# Patient Record
Sex: FEMALE | NOT HISPANIC OR LATINO | Employment: OTHER | URBAN - METROPOLITAN AREA
[De-identification: names, ages, dates, MRNs, and addresses within clinical notes are randomized per-mention and may not be internally consistent; named-entity substitution may affect disease eponyms.]

---

## 2019-10-14 ENCOUNTER — HOSPITAL ENCOUNTER (OUTPATIENT)
Dept: OTHER | Facility: HOSPITAL | Age: 54
Discharge: HOME | End: 2019-10-14
Attending: NURSE PRACTITIONER

## 2020-11-09 ENCOUNTER — HOSPITAL ENCOUNTER (OUTPATIENT)
Dept: OTHER | Facility: HOSPITAL | Age: 55
Discharge: HOME | End: 2020-11-09
Attending: FAMILY MEDICINE

## 2020-11-12 LAB — SARS-COV-2 RNA SPEC QL NAA+PROBE: DETECTED

## 2021-04-22 ENCOUNTER — HOSPITAL ENCOUNTER (OUTPATIENT)
Dept: OTHER | Facility: HOSPITAL | Age: 56
Discharge: HOME | End: 2021-04-22
Attending: NURSE PRACTITIONER

## 2021-09-17 ENCOUNTER — HOSPITAL ENCOUNTER (INPATIENT)
Facility: HOSPITAL | Age: 56
LOS: 4 days | Discharge: HOME HEALTH | End: 2021-09-21
Attending: HOSPITALIST | Admitting: INTERNAL MEDICINE

## 2021-09-17 ENCOUNTER — APPOINTMENT (OUTPATIENT)
Dept: NEUROLOGY | Facility: HOSPITAL | Age: 56
End: 2021-09-17

## 2021-09-17 DIAGNOSIS — R56.9 SEIZURE (HCC): Primary | ICD-10-CM

## 2021-09-17 DIAGNOSIS — G40.919 BREAKTHROUGH SEIZURE (HCC): ICD-10-CM

## 2021-09-17 PROBLEM — G40.909 EPILEPSY: Status: ACTIVE | Noted: 2021-09-17

## 2021-09-17 PROBLEM — Z86.73 HISTORY OF ISCHEMIC STROKE: Status: ACTIVE | Noted: 2021-09-17

## 2021-09-17 PROBLEM — E11.649 TYPE 2 DIABETES MELLITUS WITH HYPOGLYCEMIA WITHOUT COMA: Status: ACTIVE | Noted: 2021-09-17

## 2021-09-17 PROBLEM — I10 HTN (HYPERTENSION): Status: ACTIVE | Noted: 2021-09-17

## 2021-09-17 LAB
ANION GAP SERPL CALCULATED.3IONS-SCNC: 13.1 MMOL/L (ref 5–15)
BASOPHILS # BLD AUTO: 0.02 10*3/MM3 (ref 0–0.2)
BASOPHILS NFR BLD AUTO: 0.2 % (ref 0–1.5)
BUN SERPL-MCNC: 7 MG/DL (ref 6–20)
BUN/CREAT SERPL: 10 (ref 7–25)
CALCIUM SPEC-SCNC: 8.3 MG/DL (ref 8.6–10.5)
CHLORIDE SERPL-SCNC: 100 MMOL/L (ref 98–107)
CK SERPL-CCNC: 161 U/L (ref 20–180)
CO2 SERPL-SCNC: 24.9 MMOL/L (ref 22–29)
CREAT SERPL-MCNC: 0.7 MG/DL (ref 0.57–1)
D-LACTATE SERPL-SCNC: 1.9 MMOL/L (ref 0.5–2)
DEPRECATED RDW RBC AUTO: 44 FL (ref 37–54)
EOSINOPHIL # BLD AUTO: 0.01 10*3/MM3 (ref 0–0.4)
EOSINOPHIL NFR BLD AUTO: 0.1 % (ref 0.3–6.2)
ERYTHROCYTE [DISTWIDTH] IN BLOOD BY AUTOMATED COUNT: 13.3 % (ref 12.3–15.4)
GFR SERPL CREATININE-BSD FRML MDRD: 105 ML/MIN/1.73
GFR SERPL CREATININE-BSD FRML MDRD: 87 ML/MIN/1.73
GLUCOSE BLDC GLUCOMTR-MCNC: 105 MG/DL (ref 70–130)
GLUCOSE BLDC GLUCOMTR-MCNC: 112 MG/DL (ref 70–130)
GLUCOSE BLDC GLUCOMTR-MCNC: 124 MG/DL (ref 70–130)
GLUCOSE BLDC GLUCOMTR-MCNC: 124 MG/DL (ref 70–130)
GLUCOSE BLDC GLUCOMTR-MCNC: 132 MG/DL (ref 70–130)
GLUCOSE SERPL-MCNC: 120 MG/DL (ref 65–99)
HBA1C MFR BLD: 5.64 % (ref 4.8–5.6)
HCT VFR BLD AUTO: 37.2 % (ref 34–46.6)
HGB BLD-MCNC: 11.7 G/DL (ref 12–15.9)
IMM GRANULOCYTES # BLD AUTO: 0.08 10*3/MM3 (ref 0–0.05)
IMM GRANULOCYTES NFR BLD AUTO: 0.8 % (ref 0–0.5)
INR PPP: 0.97 (ref 0.9–1.1)
LYMPHOCYTES # BLD AUTO: 1.15 10*3/MM3 (ref 0.7–3.1)
LYMPHOCYTES NFR BLD AUTO: 12.1 % (ref 19.6–45.3)
MAGNESIUM SERPL-MCNC: 1.5 MG/DL (ref 1.6–2.6)
MCH RBC QN AUTO: 28 PG (ref 26.6–33)
MCHC RBC AUTO-ENTMCNC: 31.5 G/DL (ref 31.5–35.7)
MCV RBC AUTO: 89 FL (ref 79–97)
MONOCYTES # BLD AUTO: 0.51 10*3/MM3 (ref 0.1–0.9)
MONOCYTES NFR BLD AUTO: 5.4 % (ref 5–12)
NEUTROPHILS NFR BLD AUTO: 7.75 10*3/MM3 (ref 1.7–7)
NEUTROPHILS NFR BLD AUTO: 81.4 % (ref 42.7–76)
NRBC BLD AUTO-RTO: 0 /100 WBC (ref 0–0.2)
PHOSPHATE SERPL-MCNC: 3.2 MG/DL (ref 2.5–4.5)
PLATELET # BLD AUTO: 216 10*3/MM3 (ref 140–450)
PMV BLD AUTO: 10.2 FL (ref 6–12)
POTASSIUM SERPL-SCNC: 3.9 MMOL/L (ref 3.5–5.2)
PROTHROMBIN TIME: 12.6 SECONDS (ref 11.7–14.2)
RBC # BLD AUTO: 4.18 10*6/MM3 (ref 3.77–5.28)
SODIUM SERPL-SCNC: 138 MMOL/L (ref 136–145)
WBC # BLD AUTO: 9.52 10*3/MM3 (ref 3.4–10.8)

## 2021-09-17 PROCEDURE — 25010000003 LEVETIRACETAM IN NACL 0.54% 1500 MG/100ML SOLUTION: Performed by: PSYCHIATRY & NEUROLOGY

## 2021-09-17 PROCEDURE — 83605 ASSAY OF LACTIC ACID: CPT | Performed by: NURSE PRACTITIONER

## 2021-09-17 PROCEDURE — 85025 COMPLETE CBC W/AUTO DIFF WBC: CPT | Performed by: NURSE PRACTITIONER

## 2021-09-17 PROCEDURE — 82550 ASSAY OF CK (CPK): CPT | Performed by: NURSE PRACTITIONER

## 2021-09-17 PROCEDURE — 83036 HEMOGLOBIN GLYCOSYLATED A1C: CPT | Performed by: NURSE PRACTITIONER

## 2021-09-17 PROCEDURE — 25010000002 LORAZEPAM PER 2 MG: Performed by: NURSE PRACTITIONER

## 2021-09-17 PROCEDURE — 82962 GLUCOSE BLOOD TEST: CPT

## 2021-09-17 PROCEDURE — 95816 EEG AWAKE AND DROWSY: CPT

## 2021-09-17 PROCEDURE — 25010000003 LEVETIRACETAM IN NACL 0.75% 1000 MG/100ML SOLUTION: Performed by: NURSE PRACTITIONER

## 2021-09-17 PROCEDURE — 95816 EEG AWAKE AND DROWSY: CPT | Performed by: PSYCHIATRY & NEUROLOGY

## 2021-09-17 PROCEDURE — 84100 ASSAY OF PHOSPHORUS: CPT | Performed by: NURSE PRACTITIONER

## 2021-09-17 PROCEDURE — 85610 PROTHROMBIN TIME: CPT | Performed by: NURSE PRACTITIONER

## 2021-09-17 PROCEDURE — 80048 BASIC METABOLIC PNL TOTAL CA: CPT | Performed by: NURSE PRACTITIONER

## 2021-09-17 PROCEDURE — 25010000002 ONDANSETRON PER 1 MG: Performed by: NURSE PRACTITIONER

## 2021-09-17 PROCEDURE — 25010000002 LORAZEPAM PER 2 MG: Performed by: PSYCHIATRY & NEUROLOGY

## 2021-09-17 PROCEDURE — 99222 1ST HOSP IP/OBS MODERATE 55: CPT | Performed by: PSYCHIATRY & NEUROLOGY

## 2021-09-17 PROCEDURE — 83735 ASSAY OF MAGNESIUM: CPT | Performed by: NURSE PRACTITIONER

## 2021-09-17 RX ORDER — DEXTROSE MONOHYDRATE 25 G/50ML
25 INJECTION, SOLUTION INTRAVENOUS
Status: DISCONTINUED | OUTPATIENT
Start: 2021-09-17 | End: 2021-09-21 | Stop reason: HOSPADM

## 2021-09-17 RX ORDER — NITROGLYCERIN 0.4 MG/1
0.4 TABLET SUBLINGUAL
Status: DISCONTINUED | OUTPATIENT
Start: 2021-09-17 | End: 2021-09-21 | Stop reason: HOSPADM

## 2021-09-17 RX ORDER — ACETAMINOPHEN 650 MG/1
650 SUPPOSITORY RECTAL EVERY 4 HOURS PRN
Status: DISCONTINUED | OUTPATIENT
Start: 2021-09-17 | End: 2021-09-21 | Stop reason: HOSPADM

## 2021-09-17 RX ORDER — ONDANSETRON 2 MG/ML
4 INJECTION INTRAMUSCULAR; INTRAVENOUS EVERY 6 HOURS PRN
Status: DISCONTINUED | OUTPATIENT
Start: 2021-09-17 | End: 2021-09-21 | Stop reason: HOSPADM

## 2021-09-17 RX ORDER — NYSTATIN 100000 [USP'U]/G
POWDER TOPICAL EVERY 12 HOURS SCHEDULED
Status: DISCONTINUED | OUTPATIENT
Start: 2021-09-17 | End: 2021-09-21 | Stop reason: HOSPADM

## 2021-09-17 RX ORDER — SODIUM CHLORIDE 9 MG/ML
75 INJECTION, SOLUTION INTRAVENOUS CONTINUOUS
Status: DISCONTINUED | OUTPATIENT
Start: 2021-09-17 | End: 2021-09-21 | Stop reason: HOSPADM

## 2021-09-17 RX ORDER — AMLODIPINE BESYLATE 5 MG/1
5 TABLET ORAL DAILY
Status: DISCONTINUED | OUTPATIENT
Start: 2021-09-17 | End: 2021-09-19

## 2021-09-17 RX ORDER — LISINOPRIL 40 MG/1
40 TABLET ORAL DAILY
Status: DISCONTINUED | OUTPATIENT
Start: 2021-09-17 | End: 2021-09-19

## 2021-09-17 RX ORDER — ACETAMINOPHEN 160 MG/5ML
650 SOLUTION ORAL EVERY 4 HOURS PRN
Status: DISCONTINUED | OUTPATIENT
Start: 2021-09-17 | End: 2021-09-21 | Stop reason: HOSPADM

## 2021-09-17 RX ORDER — PIOGLITAZONEHYDROCHLORIDE 15 MG/1
15 TABLET ORAL DAILY
COMMUNITY

## 2021-09-17 RX ORDER — CHOLECALCIFEROL (VITAMIN D3) 125 MCG
10 CAPSULE ORAL NIGHTLY PRN
Status: DISCONTINUED | OUTPATIENT
Start: 2021-09-17 | End: 2021-09-21 | Stop reason: HOSPADM

## 2021-09-17 RX ORDER — ACETAMINOPHEN 325 MG/1
650 TABLET ORAL EVERY 4 HOURS PRN
Status: DISCONTINUED | OUTPATIENT
Start: 2021-09-17 | End: 2021-09-21 | Stop reason: HOSPADM

## 2021-09-17 RX ORDER — LORAZEPAM 2 MG/ML
2 INJECTION INTRAMUSCULAR
Status: DISCONTINUED | OUTPATIENT
Start: 2021-09-17 | End: 2021-09-21 | Stop reason: HOSPADM

## 2021-09-17 RX ORDER — LEVETIRACETAM 15 MG/ML
1500 INJECTION INTRAVASCULAR EVERY 12 HOURS SCHEDULED
Status: DISCONTINUED | OUTPATIENT
Start: 2021-09-17 | End: 2021-09-20 | Stop reason: CLARIF

## 2021-09-17 RX ORDER — DULOXETIN HYDROCHLORIDE 60 MG/1
60 CAPSULE, DELAYED RELEASE ORAL DAILY
COMMUNITY

## 2021-09-17 RX ORDER — LORAZEPAM 2 MG/ML
1 INJECTION INTRAMUSCULAR
Status: DISCONTINUED | OUTPATIENT
Start: 2021-09-17 | End: 2021-09-17

## 2021-09-17 RX ORDER — INSULIN LISPRO 100 [IU]/ML
0-7 INJECTION, SOLUTION INTRAVENOUS; SUBCUTANEOUS
Status: DISCONTINUED | OUTPATIENT
Start: 2021-09-17 | End: 2021-09-21 | Stop reason: HOSPADM

## 2021-09-17 RX ORDER — LISINOPRIL 40 MG/1
40 TABLET ORAL DAILY
COMMUNITY

## 2021-09-17 RX ORDER — LEVETIRACETAM 500 MG/1
1000 TABLET ORAL 2 TIMES DAILY
COMMUNITY
End: 2021-09-21 | Stop reason: HOSPADM

## 2021-09-17 RX ORDER — SODIUM CHLORIDE 0.9 % (FLUSH) 0.9 %
10 SYRINGE (ML) INJECTION EVERY 12 HOURS SCHEDULED
Status: DISCONTINUED | OUTPATIENT
Start: 2021-09-17 | End: 2021-09-21 | Stop reason: HOSPADM

## 2021-09-17 RX ORDER — DULOXETIN HYDROCHLORIDE 60 MG/1
60 CAPSULE, DELAYED RELEASE ORAL DAILY
Status: DISCONTINUED | OUTPATIENT
Start: 2021-09-17 | End: 2021-09-21 | Stop reason: HOSPADM

## 2021-09-17 RX ORDER — TRAZODONE HYDROCHLORIDE 50 MG/1
150 TABLET ORAL NIGHTLY
COMMUNITY
End: 2021-09-21 | Stop reason: HOSPADM

## 2021-09-17 RX ORDER — LEVETIRACETAM 500 MG/1
1000 TABLET ORAL EVERY 12 HOURS SCHEDULED
Status: DISCONTINUED | OUTPATIENT
Start: 2021-09-17 | End: 2021-09-17

## 2021-09-17 RX ORDER — ATORVASTATIN CALCIUM 80 MG/1
80 TABLET, FILM COATED ORAL NIGHTLY
COMMUNITY

## 2021-09-17 RX ORDER — LEVETIRACETAM 10 MG/ML
1000 INJECTION INTRAVASCULAR EVERY 12 HOURS SCHEDULED
Status: DISCONTINUED | OUTPATIENT
Start: 2021-09-17 | End: 2021-09-17

## 2021-09-17 RX ORDER — ATORVASTATIN CALCIUM 80 MG/1
80 TABLET, FILM COATED ORAL NIGHTLY
Status: DISCONTINUED | OUTPATIENT
Start: 2021-09-17 | End: 2021-09-21 | Stop reason: HOSPADM

## 2021-09-17 RX ORDER — ASPIRIN 81 MG/1
81 TABLET, CHEWABLE ORAL DAILY
Status: DISCONTINUED | OUTPATIENT
Start: 2021-09-17 | End: 2021-09-21 | Stop reason: HOSPADM

## 2021-09-17 RX ORDER — SODIUM CHLORIDE 0.9 % (FLUSH) 0.9 %
10 SYRINGE (ML) INJECTION AS NEEDED
Status: DISCONTINUED | OUTPATIENT
Start: 2021-09-17 | End: 2021-09-21 | Stop reason: HOSPADM

## 2021-09-17 RX ORDER — ASPIRIN 81 MG/1
81 TABLET, CHEWABLE ORAL DAILY
COMMUNITY

## 2021-09-17 RX ORDER — LANOLIN ALCOHOL/MO/W.PET/CERES
10 CREAM (GRAM) TOPICAL NIGHTLY PRN
COMMUNITY

## 2021-09-17 RX ORDER — NICOTINE POLACRILEX 4 MG
15 LOZENGE BUCCAL
Status: DISCONTINUED | OUTPATIENT
Start: 2021-09-17 | End: 2021-09-21 | Stop reason: HOSPADM

## 2021-09-17 RX ORDER — AMLODIPINE BESYLATE 5 MG/1
5 TABLET ORAL DAILY
COMMUNITY

## 2021-09-17 RX ADMIN — SODIUM CHLORIDE, PRESERVATIVE FREE 10 ML: 5 INJECTION INTRAVENOUS at 21:04

## 2021-09-17 RX ADMIN — LORAZEPAM 1 MG: 2 INJECTION INTRAMUSCULAR; INTRAVENOUS at 09:37

## 2021-09-17 RX ADMIN — ONDANSETRON 4 MG: 2 INJECTION INTRAMUSCULAR; INTRAVENOUS at 09:10

## 2021-09-17 RX ADMIN — NYSTATIN: 100000 POWDER TOPICAL at 21:04

## 2021-09-17 RX ADMIN — NYSTATIN: 100000 POWDER TOPICAL at 10:12

## 2021-09-17 RX ADMIN — LEVETIRACETAM 1000 MG: 1000 INJECTION, SOLUTION INTRAVENOUS at 08:57

## 2021-09-17 RX ADMIN — LORAZEPAM 2 MG: 2 INJECTION INTRAMUSCULAR; INTRAVENOUS at 22:17

## 2021-09-17 RX ADMIN — LORAZEPAM 1 MG: 2 INJECTION INTRAMUSCULAR; INTRAVENOUS at 16:28

## 2021-09-17 RX ADMIN — LORAZEPAM 1 MG: 2 INJECTION INTRAMUSCULAR; INTRAVENOUS at 09:12

## 2021-09-17 RX ADMIN — LORAZEPAM 1 MG: 2 INJECTION INTRAMUSCULAR; INTRAVENOUS at 16:52

## 2021-09-17 RX ADMIN — LEVETIRACETAM 1500 MG: 15 INJECTION INTRAVENOUS at 21:16

## 2021-09-17 RX ADMIN — SODIUM CHLORIDE 100 ML/HR: 9 INJECTION, SOLUTION INTRAVENOUS at 04:47

## 2021-09-17 NOTE — PLAN OF CARE
Goal Outcome Evaluation:              Outcome Summary: SLP arrived for swallow evaluation. Per RN, pt has had multiple seizures this morning, and is now lethargic on ativan. Will hold eval for now and f/u 9/18.

## 2021-09-17 NOTE — H&P
Patient Name:  Amalia Elkins  YOB: 1965  MRN:  [id number]  Admit Date:  9/17/2021  Patient Care Team:  Terrence Damon as PCP - General (Family Medicine)      Subjective   History Present Illness     Chief complaint: Breakthrough seizures  History of Present Illness   Mrs. Elkins is a 56-year-old female with history of epilepsy, stroke with right side weakness, type 2 diabetes, hypertension who comes to [facility] from [facility] with breakthrough seizures.  Patient presented to outside hospital with breakthrough grand mal seizures, she had three at home and then had three grand mal seizures in the emergency room at outside hospital, patient was given Versed in the ambulance and two doses of Ativan, she was also given 4 g of Keppra and her seizure activity stopped.  Patient is unable to give me any significant information at this time due to some sedation, she does wake up answer simple questions but falls back to sleep, she does have some slurred speech which is possible baseline from previous CVA, she does have some residual right-sided weakness from CVA according to family, but she is normally able to walk with a walker.  Patient lives at home with her daughter, again walks with a walker has baseline right side facial droop.  Patient is very sedated this time, she does have slurred speech, minimal movement on her right side.  Reported CT of head done at outside facility was negative, no report was sent with patient, however there is a CD to download, lactic acid was 4.1, patient was given IV fluids in the emergency room, CK elevated.  Will obtain labs now upon arrival here [facility].  Again patient is arousable, slightly tachycardic, on 2 L of oxygen with sats 97%.  No seizure activity noted    Review of Systems   Unable to perform ROS: Mental status change        Personal History     Past Medical History:   Diagnosis Date   • Diabetes mellitus (CMS/[facility])    •  Hypertension    • Stroke (CMS/HCC)      History reviewed. No pertinent surgical history.  History reviewed. No pertinent family history.  Social History     Tobacco Use   • Smoking status: Never Smoker   • Smokeless tobacco: Never Used   Vaping Use   • Vaping Use: Never used   Substance Use Topics   • Alcohol use: Never   • Drug use: Not on file     No current facility-administered medications on file prior to encounter.     Current Outpatient Medications on File Prior to Encounter   Medication Sig Dispense Refill   • amLODIPine (NORVASC) 5 MG tablet Take 5 mg by mouth Daily.     • aspirin 81 MG chewable tablet Chew 81 mg Daily.     • atorvastatin (LIPITOR) 80 MG tablet Take 80 mg by mouth Every Night.     • DULoxetine (CYMBALTA) 60 MG capsule Take 60 mg by mouth Daily.     • levETIRAcetam (KEPPRA) 500 MG tablet Take 1,000 mg by mouth 2 (Two) Times a Day.     • lisinopril (PRINIVIL,ZESTRIL) 40 MG tablet Take 40 mg by mouth Daily.     • melatonin 3 MG tablet Take 10 mg by mouth At Night As Needed.     • metFORMIN (GLUCOPHAGE) 1000 MG tablet Take 1,000 mg by mouth 2 (Two) Times a Day With Meals.     • pioglitazone (ACTOS) 15 MG tablet Take 15 mg by mouth Daily.     • traZODone (DESYREL) 50 MG tablet Take 150 mg by mouth Every Night.       No Known Allergies    Objective    Objective     Vital Signs  Temp:  [98.2 °F (36.8 °C)] 98.2 °F (36.8 °C)  Heart Rate:  [109] 109  Resp:  [15] 15  BP: (150)/(93) 150/93  SpO2:  [97 %] 97 %  on  Flow (L/min):  [2] 2;   Device (Oxygen Therapy): nasal cannula  Body mass index is 39.36 kg/m².    Physical Exam  Vitals and nursing note reviewed.   Constitutional:       General: She is not in acute distress.     Appearance: She is well-developed.   HENT:      Head: Normocephalic.   Neck:      Vascular: No JVD.   Cardiovascular:      Rate and Rhythm: Normal rate and regular rhythm.      Comments: Sinus tachycardia on the monitor with heart rate 110 during my exam, no chest pain, appears to  be in no acute distress.  Pulmonary:      Effort: Pulmonary effort is normal.      Breath sounds: Normal breath sounds.      Comments: O2 sats 96% on 2 L of oxygen during my exam, lung sounds clear  Abdominal:      General: There is no distension.      Palpations: Abdomen is soft.      Tenderness: There is no abdominal tenderness.   Musculoskeletal:         General: Normal range of motion.      Cervical back: Normal range of motion.   Skin:     General: Skin is warm and dry.      Capillary Refill: Capillary refill takes less than 2 seconds.   Neurological:      Mental Status: She is lethargic.      Comments: Patient drowsy but arouses easily, but also falls back asleep easily, will answer simple questions but her speech is slurred.  She has little movement of her right arm and leg, she does have baseline weakness, unsure if this is worsening weakness or baseline.   Psychiatric:         Mood and Affect: Mood normal.         Speech: Speech is slurred.         Behavior: Behavior is slowed.         Cognition and Memory: Cognition is impaired. Memory is impaired.         Results Review:  I reviewed the patient's new clinical results.  I reviewed the patient's new imaging results and agree with the interpretation.  I reviewed the patient's other test results and agree with the interpretation  I personally viewed and interpreted the patient's EKG/Telemetry data  Discussed with ED provider.    Lab Results (last 24 hours)     ** No results found for the last 24 hours. **          Imaging Results (Last 24 Hours)     ** No results found for the last 24 hours. **              No orders to display        Assessment/Plan     Active Hospital Problems    Diagnosis  POA   • **Breakthrough seizure (CMS/HCC) [G40.919]  Yes   • Type 2 diabetes mellitus with hypoglycemia without coma (CMS/HCC) [E11.649]  Yes   • Epilepsy (CMS/HCC) [G40.909]  Yes   • History of ischemic stroke [Z86.73]  Not Applicable   • HTN (hypertension) [I10]  Yes      Mrs. Elkins is a 56-year-old female with history of epilepsy, stroke with right side weakness, type 2 diabetes, hypertension who comes to [facility] from [facility] with breakthrough seizures.    Breakthrough seizure/epilepsy  -Keppra 1000 mg IV twice daily, defer to neurology for any changes  -CT unremarkable done at outside facility, will defer to neurology for any further imaging  -Seizure precautions  -Neurochecks every 4 hours  -Telemetry unit for monitoring  -Ativan as needed for breakthrough seizures    History of ischemic stroke  -N.p.o. until seen by speech therapy  -PT to eval and treat  -Baseline right-sided weakness, likely worsened due to multiple seizures as well as Versed and Ativan, will continue to monitor neuro assessment closely    Type 2 diabetes  -Accu-Cheks before meals and at bedtime with correctional dose insulin  -Check A1c in a.m.  -Hold Metformin and Actos    · I discussed the patient's findings and my recommendations with patient and ED provider.    VTE Prophylaxis - SCDs.  Code Status - Full code.       RAFI Decker  [facility]  09/17/21  04:50 EDT

## 2021-09-17 NOTE — PLAN OF CARE
Goal Outcome Evaluation:  Plan of Care Reviewed With: patient        Progress: no change  Outcome Summary: New admit for seizure, opens eyes to voice and follows some commands, no seizure noted since arrival to floor, seizure precautions maintained.

## 2021-09-17 NOTE — CONSULTS
"Neurology Consult Note    Consult Date: 9/17/2021    Referring MD: Bruce Pierson MD    Reason for Consult I have been asked to see the patient in neurological consultation to render advice and opinion regarding seizures    Amalia Elkins is a 56 y.o. female with past medical history of hypertension, diabetes, prior left anterior cerebral artery territory stroke after which she developed epilepsy.  She has been on Keppra for that for a number of years.  Her Keppra was recently increased to 1000 mg twice daily several months ago when she had been seizure-free on that dose.  However her medication was recently refilled and family noticed that she was accidentally taking 750 twice daily again.  She had a seizure and was seen at an outside emergency department and discharged but then went home and had multiple additional seizures and was admitted here for status epilepticus.  Since admission she had a few brief seizures this morning and received Ativan.  She has not had any subsequent spells since getting Ativan.  Keppra 1000 twice daily was restarted.    Past Medical History:   Diagnosis Date   • Diabetes mellitus (CMS/[facility])    • Hypertension    • Stroke (CMS/[facility])        ROS:  Unable to obtain in detail, patient postictal and sedated    Exam  /90 (BP Location: Left arm, Patient Position: Lying)   Pulse 109   Temp 99.6 °F (37.6 °C) (Oral)   Resp 16   Ht 157.5 cm (62\")   Wt 107 kg (236 lb 8.9 oz)   SpO2 97%   BMI 43.27 kg/m²   Gen: NAD, vitals reviewed  MS: Opens eyes to voice, answers a few questions but then falls back to sleep, follows commands  CN: visual acuity grossly normal, PERRL, EOMI, no facial droop, no dysarthria  Motor: 2/5 right upper and lower extremity decreased tone 5/5 left upper and lower extremity normal tone  Sensory: Intact to light touch throughout    DATA:    No results found for: GLUCOSE, CALCIUM, NA, K, CO2, CL, BUN, CREATININE, EGFRIFAFRI, EGFRIFNONA, BCR, ANIONGAP  Lab Results "   Component Value Date    WBC 7.29 11/29/2020    HGB 11.6 (L) 11/29/2020    HCT 36.2 11/29/2020    MCV 85.2 11/29/2020     11/29/2020       Lab review: Hemoglobin 10.6    I reviewed outside records from [facility] from a prior admission for seizures.  MRI at that time showed chronic left ANTONY territory infarct.  She was diagnosed with focal low stroke epilepsy and Keppra was continued    Diagnoses:  Epilepsy, focal onset with complex partial seizures, with status epilepticus, not intractable  History of left anterior cerebral artery territory infarct    Comment: Restart Keppra 1000 twice daily.  Check EEG today.  Maintain seizure precautions    PLAN:  Seizure precautions, Ativan 2 mg every 4 hours as needed for additional seizures  Keppra 1000 twice daily  Routine EEG  We will consider adding an additional AED if she has additional seizures despite the above.

## 2021-09-18 ENCOUNTER — APPOINTMENT (OUTPATIENT)
Dept: NEUROLOGY | Facility: HOSPITAL | Age: 56
End: 2021-09-18

## 2021-09-18 LAB
GLUCOSE BLDC GLUCOMTR-MCNC: 103 MG/DL (ref 70–130)
GLUCOSE BLDC GLUCOMTR-MCNC: 115 MG/DL (ref 70–130)
GLUCOSE BLDC GLUCOMTR-MCNC: 92 MG/DL (ref 70–130)
GLUCOSE BLDC GLUCOMTR-MCNC: 93 MG/DL (ref 70–130)

## 2021-09-18 PROCEDURE — 25010000002 LORAZEPAM PER 2 MG: Performed by: PSYCHIATRY & NEUROLOGY

## 2021-09-18 PROCEDURE — 92610 EVALUATE SWALLOWING FUNCTION: CPT

## 2021-09-18 PROCEDURE — 82962 GLUCOSE BLOOD TEST: CPT

## 2021-09-18 PROCEDURE — 95720 EEG PHY/QHP EA INCR W/VEEG: CPT | Performed by: PSYCHIATRY & NEUROLOGY

## 2021-09-18 PROCEDURE — 99232 SBSQ HOSP IP/OBS MODERATE 35: CPT | Performed by: PSYCHIATRY & NEUROLOGY

## 2021-09-18 PROCEDURE — 95716 VEEG EA 12-26HR CONT MNTR: CPT

## 2021-09-18 PROCEDURE — 25010000003 LEVETIRACETAM IN NACL 0.54% 1500 MG/100ML SOLUTION: Performed by: PSYCHIATRY & NEUROLOGY

## 2021-09-18 RX ADMIN — SODIUM CHLORIDE 100 MG: 9 INJECTION, SOLUTION INTRAVENOUS at 21:41

## 2021-09-18 RX ADMIN — LORAZEPAM 2 MG: 2 INJECTION INTRAMUSCULAR; INTRAVENOUS at 08:22

## 2021-09-18 RX ADMIN — NYSTATIN: 100000 POWDER TOPICAL at 21:14

## 2021-09-18 RX ADMIN — LORAZEPAM 2 MG: 2 INJECTION INTRAMUSCULAR; INTRAVENOUS at 21:08

## 2021-09-18 RX ADMIN — SODIUM CHLORIDE 200 MG: 9 INJECTION, SOLUTION INTRAVENOUS at 09:27

## 2021-09-18 RX ADMIN — SODIUM CHLORIDE, PRESERVATIVE FREE 10 ML: 5 INJECTION INTRAVENOUS at 21:04

## 2021-09-18 RX ADMIN — LEVETIRACETAM 1500 MG: 15 INJECTION INTRAVENOUS at 08:19

## 2021-09-18 RX ADMIN — LORAZEPAM 2 MG: 2 INJECTION INTRAMUSCULAR; INTRAVENOUS at 05:11

## 2021-09-18 RX ADMIN — SODIUM CHLORIDE 100 ML/HR: 9 INJECTION, SOLUTION INTRAVENOUS at 02:59

## 2021-09-18 RX ADMIN — LEVETIRACETAM 1500 MG: 15 INJECTION INTRAVENOUS at 21:05

## 2021-09-18 NOTE — PROGRESS NOTES
"DOS: 2021  NAME: Amalia Elkins   : 1965  PCP: Terrence Damon  No chief complaint on file.      Chief complaint: seizure  Subjective: Multiple seizures overnight.  Got 4 mg total of Ativan.  Lethargic this morning    Objective:  Vital signs: /90   Pulse 106   Temp 99.8 °F (37.7 °C) (Oral)   Resp 16   Ht 157.5 cm (62\")   Wt 107 kg (236 lb 8.9 oz)   SpO2 96%   BMI 43.27 kg/m²    Gen: NAD, vitals reviewed  MS: Lethargic but opens eyes to voice and follows commands  CN: visual acuity grossly normal, visual fields full, pupils 3 mm, reactive, extraocular movements intact, no facial asymmetry, severe dysarthria  Motor: 0/5 right upper and lower extremity, 5/5 left upper and lower extremity, decreased tone right arm  Sensory: intact to light touch all 4 ext.    ROS:  + Weakness, numbness, seizures  No fevers, chills      Laboratory results:  Lab Results   Component Value Date    GLUCOSE 120 (H) 2021    CALCIUM 8.3 (L) 2021     2021    K 3.9 2021    CO2 24.9 2021     2021    BUN 7 2021    CREATININE 0.70 2021    EGFRIFAFRI 105 2021    EGFRIFNONA 87 2021    BCR 10.0 2021    ANIONGAP 13.1 2021     Lab Results   Component Value Date    WBC 9.52 2021    HGB 11.7 (L) 2021    HCT 37.2 2021    MCV 89.0 2021     2021     No results found for: LDL      Lab 21  0956   HEMOGLOBIN A1C 5.64*        Review of labs: CBC, BMP unremarkable    Stat continuous EEG ordered    Diagnoses:  Epilepsy, focal onset with complex partial seizures, with status epilepticus, not intractable  History of left anterior cerebral artery stroke    Comment: Multiple breakthrough seizures without complete recovery to baseline consistent with status epilepticus.  We will escalate her therapy by adding Vimpat and continue with as needed Ativan.  I will start continuous EEG today    Plan:  1.  Stat continuous EEG  2.  " Keppra increased to 1.5 g every 12 hours  3.  Vimpat added with 200mg IV load, continue 100 q12H  4.  Ativan 2mg q4 prn seizure  5.  If continuing to seize on the above we will probably need to get MRI to look for a structural cause of her seizure exacerbation  6.  Vital signs remain stable and patient is arousable and conversant, I do not feel that she needs ICU care yet. Can stay on telemetry floor for now.    Discussed with nursing staff

## 2021-09-18 NOTE — PLAN OF CARE
Pt A&O to self only, arouses to voice. Only one seizure this shift witnessed at 0820, ativan given x1. Speech approved to give meds crushed in apple sauce. This RN updated Anu and Junior, other family members continued to call for updates and were encouraged to communicate with one family member who calls for updates. Anu updated a second time this shift. Continuous EEG monitoring in place, broussard to remain until finished in AM. VSS, sinus tachy at times. WCTM   Problem: Adult Inpatient Plan of Care  Goal: Plan of Care Review  Outcome: Ongoing, Progressing  Flowsheets (Taken 9/18/2021 1814)  Progress: improving  Plan of Care Reviewed With: patient  Goal: Patient-Specific Goal (Individualized)  Outcome: Ongoing, Progressing  Goal: Absence of Hospital-Acquired Illness or Injury  Outcome: Ongoing, Progressing  Intervention: Identify and Manage Fall Risk  Recent Flowsheet Documentation  Taken 9/18/2021 1800 by Brooklyn Dixon, RN  Safety Promotion/Fall Prevention:   safety round/check completed   room organization consistent   nonskid shoes/slippers when out of bed   fall prevention program maintained   clutter free environment maintained   assistive device/personal items within reach   activity supervised  Taken 9/18/2021 1605 by Brooklyn Dixon, RN  Safety Promotion/Fall Prevention:   safety round/check completed   room organization consistent   nonskid shoes/slippers when out of bed   fall prevention program maintained   clutter free environment maintained   activity supervised   assistive device/personal items within reach  Taken 9/18/2021 1415 by Brooklyn Dixon, RN  Safety Promotion/Fall Prevention:   safety round/check completed   room organization consistent   nonskid shoes/slippers when out of bed   fall prevention program maintained   clutter free environment maintained   assistive device/personal items within reach   activity supervised  Taken 9/18/2021 1230 by Brooklyn Dixon, RN  Safety Promotion/Fall  Prevention:   safety round/check completed   room organization consistent   nonskid shoes/slippers when out of bed   fall prevention program maintained   clutter free environment maintained   assistive device/personal items within reach   activity supervised  Taken 9/18/2021 1040 by Brooklyn Dixon RN  Safety Promotion/Fall Prevention:   safety round/check completed   room organization consistent   nonskid shoes/slippers when out of bed   fall prevention program maintained   clutter free environment maintained   assistive device/personal items within reach   activity supervised  Taken 9/18/2021 0800 by Brooklyn Dixon RN  Safety Promotion/Fall Prevention:   activity supervised   assistive device/personal items within reach   clutter free environment maintained   fall prevention program maintained   nonskid shoes/slippers when out of bed   room organization consistent   safety round/check completed  Intervention: Prevent and Manage VTE (venous thromboembolism) Risk  Recent Flowsheet Documentation  Taken 9/18/2021 0800 by Brooklyn Dixon RN  VTE Prevention/Management:   bilateral   dorsiflexion/plantar flexion performed  Goal: Optimal Comfort and Wellbeing  Outcome: Ongoing, Progressing  Intervention: Provide Person-Centered Care  Recent Flowsheet Documentation  Taken 9/18/2021 0800 by Brooklyn Dixon RN  Trust Relationship/Rapport:   care explained   thoughts/feelings acknowledged  Goal: Readiness for Transition of Care  Outcome: Ongoing, Progressing   Goal Outcome Evaluation:  Plan of Care Reviewed With: patient        Progress: improving

## 2021-09-18 NOTE — PROGRESS NOTES
" LOS: 1 day     Name: Amalia Elkins  Age: 56 y.o.  Sex: female  :  1965  MRN: [id number]         Primary Care Physician: Terrence Damon   Subjective  Has had 3 additional seizures since I saw her yesterday.  Looks postictal at present.  Received Ativan about 5:00 this morning.    Objective   Vital Signs  Temp:  [98.8 °F (37.1 °C)-99.8 °F (37.7 °C)] 99.8 °F (37.7 °C)  Heart Rate:  [106-118] 106  Resp:  [14-16] 16  BP: (136-162)/() 156/90  Body mass index is 43.27 kg/m².    Objective:  General Appearance:  Comfortable and in no acute distress (Drowsy and postictal appearing).    Vital signs: (most recent): Blood pressure 156/90, pulse 106, temperature 99.8 °F (37.7 °C), temperature source Oral, resp. rate 16, height 157.5 cm (62\"), weight 107 kg (236 lb 8.9 oz), SpO2 96 %.    Lungs:  Normal effort and normal respiratory rate.  She is not in respiratory distress.  There are decreased breath sounds.    Heart: Tachycardia.  Regular rhythm.    Abdomen: Abdomen is soft.  Bowel sounds are normal.   There is no abdominal tenderness.     Extremities: There is no dependent edema or local swelling.    Neurological: Patient is alert and oriented to person, place and time.    Skin:  Warm and dry.              Results Review:       I reviewed the patient's new clinical results.    Results from last 7 days   Lab Units 21  0956   WBC 10*3/mm3 9.52   HEMOGLOBIN g/dL 11.7*   PLATELETS 10*3/mm3 216     Results from last 7 days   Lab Units 21  0956   SODIUM mmol/L 138   POTASSIUM mmol/L 3.9   CHLORIDE mmol/L 100   CO2 mmol/L 24.9   BUN mg/dL 7   CREATININE mg/dL 0.70   CALCIUM mg/dL 8.3*   GLUCOSE mg/dL 120*     Results from last 7 days   Lab Units 21  0956   INR  0.97             Scheduled Meds:   amLODIPine, 5 mg, Oral, Daily  aspirin, 81 mg, Oral, Daily  atorvastatin, 80 mg, Oral, Nightly  DULoxetine, 60 mg, Oral, Daily  insulin lispro, 0-7 Units, Subcutaneous, 4x Daily With Meals & " Nightly  Lacosamide, 100 mg, Intravenous, Q12H  Lacosamide, 200 mg, Intravenous, Once  levETIRAcetam, 1,500 mg, Intravenous, Q12H  lisinopril, 40 mg, Oral, Daily  nystatin, , Topical, Q12H  sodium chloride, 10 mL, Intravenous, Q12H      PRN Meds:   •  acetaminophen **OR** acetaminophen **OR** acetaminophen  •  dextrose  •  dextrose  •  glucagon (human recombinant)  •  LORazepam  •  melatonin  •  nitroglycerin  •  ondansetron  •  sodium chloride  Continuous Infusions:  sodium chloride, 100 mL/hr, Last Rate: 100 mL/hr (09/18/21 0259)        Assessment/Plan   Active Hospital Problems    Diagnosis  POA   • **Breakthrough seizure (CMS/HCC) [G40.919]  Yes   • Type 2 diabetes mellitus with hypoglycemia without coma (CMS/HCC) [E11.649]  Yes   • Epilepsy (CMS/HCC) [G40.909]  Yes   • History of ischemic stroke [Z86.73]  Not Applicable   • HTN (hypertension) [I10]  Yes   • Seizure (CMS/HCC) [R56.9]  Yes      Resolved Hospital Problems   No resolved problems to display.       Assessment & Plan    -Management of breakthrough seizures per neurology.  Vimpat has been added.  Greatly appreciate their assistance.  -Blood sugars well controlled on current regimen  -Supportive care with gentle IV fluids  -Discontinue Henderson catheter      I wore full protective equipment throughout the patient encounter including eye protection and facemask.  Hand hygiene was performed before donning protective equipment and after removal when leaving the room.    Sam Fajardo MD  [facility]  09/18/21  08:13 EDT

## 2021-09-18 NOTE — THERAPY EVALUATION
Acute Care - Speech Language Pathology   Swallow Initial Evaluation [facility]     Patient Name: Amalia Elkins  : 1965  MRN: [id number]  Today's Date: 2021               Admit Date: 2021    Visit Dx:   No diagnosis found.  Patient Active Problem List   Diagnosis   • Type 2 diabetes mellitus with hypoglycemia without coma (CMS/HCC)   • Epilepsy (CMS/HCC)   • History of ischemic stroke   • HTN (hypertension)   • Breakthrough seizure (CMS/HCC)   • Seizure (CMS/HCC)     Past Medical History:   Diagnosis Date   • Diabetes mellitus (CMS/HCC)    • Hypertension    • Stroke (CMS/HCC)      History reviewed. No pertinent surgical history.    Patient was not wearing a face mask during this therapy encounter. Therapist used appropriate personal protective equipment including mask, eye protection and gloves.  Mask used was standard procedure mask. Appropriate PPE was worn during the entire therapy session. Hand hygiene was completed before and after therapy session. Patient is not in enhanced droplet precautions.       SLP Recommendation and Plan  SLP Swallowing Diagnosis: mild-moderate, oral dysphagia, suspected pharyngeal dysphagia  SLP Diet Recommendation: NPO (see above)     SLP Rec. for Method of Medication Administration: meds crushed, with pudding or applesauce     Monitor for Signs of Aspiration: notify SLP if any concerns, yes  Recommended Diagnostics: reassess via clinical swallow evaluation  Swallow Criteria for Skilled Therapeutic Interventions Met: demonstrates skilled criteria  Anticipated Discharge Disposition (SLP): unknown  Rehab Potential/Prognosis, Swallowing: good, to achieve stated therapy goals  Therapy Frequency (Swallow): PRN  Predicted Duration Therapy Intervention (Days): until discharge                         Plan of Care Reviewed With: patient  Outcome Summary: Clinical swallowing evaluation completed. Pt extremely lethargic and required constant cues to remain alert for each trial.  Pt presents with suspected mild-moderate oropharyngeal dysphagia characterized by slow oral transit with puree with repetitive lingual A-P movements and suspected uncoordinated oral transit and swallow initiation/airway closure indicated by delayed cough after thin via cup x1 and straw x1. Pt exhibited no overt s/s of penetration/aspiration during any trials of nectar thick liquid via straw or puree. Pt appears at high risk of aspiration at this time given lethargy and s/s noted. Recommend to continue NPO at this time, however, if pt is alert, pt can have meds crushed in puree and drinks of nectar thick liquids/bites of puree. Fully upright posture and 1:1 assist. Will plan to reassess on 9/20.         SWALLOW EVALUATION (last 72 hours)      SLP Adult Swallow Evaluation     Row Name 09/18/21 1200          Document Type  evaluation  -ML    Subjective Information  no complaints  -ML    Patient Observations  lethargic  -ML    Patient/Family/Caregiver Comments/Observations  No family present. Pt very lethargic and requiring constant cues to alert enough for p.o. trials.   -ML    Care Plan Review  evaluation/treatment results reviewed  -ML    Patient Effort  adequate  -ML    Symptoms Noted During/After Treatment  none  -ML          Patient Profile Reviewed  yes  -ML    Pertinent History Of Current Problem  s/p multiple seizures. 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   -ML    Current Method of Nutrition  NPO  -ML    Prior Level of Function-Communication  unknown  -ML    Prior Level of Function-Swallowing  unknown  -ML    Barriers to Rehab  medically complex  -ML          Dentition Assessment  edentulous  -ML    Secretion Management  WNL/WFL  -ML    Mucosal Quality  moist, healthy  -ML          Oral Motor, Comment  Pt with noted foward resting tongue position and moderate-severe dysarthria suspect to be impacted by lethargy as well. Pt followed minimal oral  motor movements which all appeared with impaired ROM. Pt appeared to exhibit R facial droop at rest.   -ML          Respiratory Support Currently in Use  room air  -ML    Eating/Swallowing Skills  fed by SLP  -ML    Positioning During Eating  upright in bed  -ML    Utensils Used  spoon;cup;straw  -ML    Consistencies Trialed  pureed;ice chips;thin liquids;nectar/syrup-thick liquids  -ML          Clinical Swallow Evaluation Summary  Clinical swallowing evaluation completed. Pt extremely lethargic and required constant cues to remain alert for each trial. Pt presents with suspected mild-moderate oropharyngeal dysphagia characterized by slow oral transit with puree with repetitive lingual A-P movements and suspected uncoordinated oral transit and swallow initiation/airway closure indicated by delayed cough after thin via cup x1 and straw x1. Pt exhibited no overt s/s of penetration/aspiration during any trials of nectar thick liquid via straw or puree. Pt appears at high risk of aspiration at this time given lethargy and s/s noted. Recommend to continue NPO at this time, however, if pt is alert, pt can have meds crushed in puree and drinks of nectar thick liquids/bites of puree. Fully upright posture and 1:1 assist. Will plan to reassess on 9/20.   -ML          SLP Swallowing Diagnosis  mild-moderate;oral dysphagia;suspected pharyngeal dysphagia  -ML    Functional Impact  risk of aspiration/pneumonia  -ML    Rehab Potential/Prognosis, Swallowing  good, to achieve stated therapy goals  -ML    Swallow Criteria for Skilled Therapeutic Interventions Met  demonstrates skilled criteria  -ML          Therapy Frequency (Swallow)  PRN  -ML    Predicted Duration Therapy Intervention (Days)  until discharge  -ML    SLP Diet Recommendation  NPO see above  -ML    Recommended Diagnostics  reassess via clinical swallow evaluation  -ML    Oral Care Recommendations  Oral Care BID/PRN  -ML    SLP Rec. for Method of Medication  Administration  meds crushed;with pudding or applesauce  -ML    Monitor for Signs of Aspiration  notify SLP if any concerns;yes  -ML    Anticipated Discharge Disposition (SLP)  unknown  -ML          Oral Nutrition/Hydration Goal Selection (SLP)  oral nutrition/hydration, SLP goal 1  -ML          Oral Nutrition/Hydration Goal 1, SLP  Pt will safely swallow least restrictive diet without overt s/s of penetration/aspiration.   -ML    Time Frame (Oral Nutrition/Hydration Goal 1, SLP)  by discharge  -ML      User Key  (r) = Recorded By, (t) = Taken By, (c) = Cosigned By    Initials Name Effective Dates    Adrianna Nguyễn MS CCC-SLP 06/16/21 -           EDUCATION  The patient has been educated in the following areas:   Dysphagia (Swallowing Impairment).       SLP GOALS     Row Name 09/18/21 1200             Oral Nutrition/Hydration Goal 1 (SLP)    Oral Nutrition/Hydration Goal 1, SLP  Pt will safely swallow least restrictive diet without overt s/s of penetration/aspiration.   -ML      Time Frame (Oral Nutrition/Hydration Goal 1, SLP)  by discharge  -ML        User Key  (r) = Recorded By, (t) = Taken By, (c) = Cosigned By    Initials Name Provider Type    Adrianna Nguyễn MS CCC-SLP Speech and Language Pathologist           SLP Outcome Measures (last 72 hours)      SLP Outcome Measures     Row Name 09/18/21 1400             SLP Outcome Measures    Outcome Measure Used?  Adult NOMS  -ML         Adult FCM Scores    FCM Chosen  Swallowing  -ML      Swallowing FCM Score  2  -ML        User Key  (r) = Recorded By, (t) = Taken By, (c) = Cosigned By    Initials Name Effective Dates    Adrianna Nguyễn MS CCC-SLP 06/16/21 -            Time Calculation:   Time Calculation- SLP     Row Name 09/18/21 1452             Time Calculation- SLP    SLP Start Time  1200  -ML         Untimed Charges    SLP Eval/Re-eval   ST Eval Oral Pharyng Swallow - 42681  -ML      [id number] Eval Oral Pharyng Swallow Minutes  60  -ML          Total Minutes    Untimed Charges Total Minutes  60  -ML       Total Minutes  60  -ML        User Key  (r) = Recorded By, (t) = Taken By, (c) = Cosigned By    Initials Name Provider Type    Adrianna Nguyễn MS CCC-SLP Speech and Language Pathologist          Therapy Charges for Today     Code Description Service Date Service Provider Modifiers Qty    86982912324 HC ST EVAL ORAL PHARYNG SWALLOW 4 9/18/2021 Adrianna Leija MS CCC-SLP GN 1               MS NICHOLAS Barragan  9/18/2021

## 2021-09-18 NOTE — PLAN OF CARE
Goal Outcome Evaluation:  Plan of Care Reviewed With: patient           Outcome Summary: Clinical swallowing evaluation completed. Pt extremely lethargic and required constant cues to remain alert for each trial. Pt presents with suspected mild-moderate oropharyngeal dysphagia characterized by slow oral transit with puree with repetitive lingual A-P movements and suspected uncoordinated oral transit and swallow initiation/airway closure indicated by delayed cough after thin via cup x1 and straw x1. Pt exhibited no overt s/s of penetration/aspiration during any trials of nectar thick liquid via straw or puree. Pt appears at high risk of aspiration at this time given lethargy and s/s noted. Recommend to continue NPO at this time, however, if pt is alert, pt can have meds crushed in puree and drinks of nectar thick liquids/bites of puree. Fully upright posture and 1:1 assist. Will plan to reassess on 9/20.

## 2021-09-18 NOTE — PLAN OF CARE
Goal Outcome Evaluation:         Pt. Had 3 seizure episode in this shift. Each lasting less than 1 min. Pt. Received total of 4mg of ativan. Pt. Has been Lethargic the whole shift, Dr. Lindquist notified.

## 2021-09-19 LAB
ANION GAP SERPL CALCULATED.3IONS-SCNC: 13.7 MMOL/L (ref 5–15)
BUN SERPL-MCNC: 8 MG/DL (ref 6–20)
BUN/CREAT SERPL: 11.3 (ref 7–25)
CALCIUM SPEC-SCNC: 8.6 MG/DL (ref 8.6–10.5)
CHLORIDE SERPL-SCNC: 104 MMOL/L (ref 98–107)
CO2 SERPL-SCNC: 25.3 MMOL/L (ref 22–29)
CREAT SERPL-MCNC: 0.71 MG/DL (ref 0.57–1)
DEPRECATED RDW RBC AUTO: 40.4 FL (ref 37–54)
ERYTHROCYTE [DISTWIDTH] IN BLOOD BY AUTOMATED COUNT: 13.1 % (ref 12.3–15.4)
GFR SERPL CREATININE-BSD FRML MDRD: 104 ML/MIN/1.73
GFR SERPL CREATININE-BSD FRML MDRD: 85 ML/MIN/1.73
GLUCOSE BLDC GLUCOMTR-MCNC: 104 MG/DL (ref 70–130)
GLUCOSE BLDC GLUCOMTR-MCNC: 111 MG/DL (ref 70–130)
GLUCOSE BLDC GLUCOMTR-MCNC: 114 MG/DL (ref 70–130)
GLUCOSE BLDC GLUCOMTR-MCNC: 122 MG/DL (ref 70–130)
GLUCOSE SERPL-MCNC: 94 MG/DL (ref 65–99)
HCT VFR BLD AUTO: 36.3 % (ref 34–46.6)
HGB BLD-MCNC: 12.1 G/DL (ref 12–15.9)
MAGNESIUM SERPL-MCNC: 1.9 MG/DL (ref 1.6–2.6)
MCH RBC QN AUTO: 28.7 PG (ref 26.6–33)
MCHC RBC AUTO-ENTMCNC: 33.3 G/DL (ref 31.5–35.7)
MCV RBC AUTO: 86 FL (ref 79–97)
PLATELET # BLD AUTO: 219 10*3/MM3 (ref 140–450)
PMV BLD AUTO: 10.2 FL (ref 6–12)
POTASSIUM SERPL-SCNC: 3.5 MMOL/L (ref 3.5–5.2)
RBC # BLD AUTO: 4.22 10*6/MM3 (ref 3.77–5.28)
SODIUM SERPL-SCNC: 143 MMOL/L (ref 136–145)
WBC # BLD AUTO: 8.88 10*3/MM3 (ref 3.4–10.8)

## 2021-09-19 PROCEDURE — 82962 GLUCOSE BLOOD TEST: CPT

## 2021-09-19 PROCEDURE — 99232 SBSQ HOSP IP/OBS MODERATE 35: CPT | Performed by: PSYCHIATRY & NEUROLOGY

## 2021-09-19 PROCEDURE — 97161 PT EVAL LOW COMPLEX 20 MIN: CPT

## 2021-09-19 PROCEDURE — 80048 BASIC METABOLIC PNL TOTAL CA: CPT | Performed by: INTERNAL MEDICINE

## 2021-09-19 PROCEDURE — 25010000003 LEVETIRACETAM IN NACL 0.54% 1500 MG/100ML SOLUTION: Performed by: PSYCHIATRY & NEUROLOGY

## 2021-09-19 PROCEDURE — 85027 COMPLETE CBC AUTOMATED: CPT | Performed by: INTERNAL MEDICINE

## 2021-09-19 PROCEDURE — 83735 ASSAY OF MAGNESIUM: CPT | Performed by: INTERNAL MEDICINE

## 2021-09-19 PROCEDURE — 97110 THERAPEUTIC EXERCISES: CPT

## 2021-09-19 RX ADMIN — SODIUM CHLORIDE, PRESERVATIVE FREE 10 ML: 5 INJECTION INTRAVENOUS at 21:33

## 2021-09-19 RX ADMIN — LEVETIRACETAM 1500 MG: 15 INJECTION INTRAVENOUS at 21:32

## 2021-09-19 RX ADMIN — LEVETIRACETAM 1500 MG: 15 INJECTION INTRAVENOUS at 09:07

## 2021-09-19 RX ADMIN — SODIUM CHLORIDE, PRESERVATIVE FREE 10 ML: 5 INJECTION INTRAVENOUS at 09:07

## 2021-09-19 RX ADMIN — SODIUM CHLORIDE 100 MG: 9 INJECTION, SOLUTION INTRAVENOUS at 20:40

## 2021-09-19 RX ADMIN — SODIUM CHLORIDE 100 MG: 9 INJECTION, SOLUTION INTRAVENOUS at 08:13

## 2021-09-19 RX ADMIN — ASPIRIN 81 MG: 81 TABLET, CHEWABLE ORAL at 09:40

## 2021-09-19 RX ADMIN — METOPROLOL TARTRATE 5 MG: 1 INJECTION, SOLUTION INTRAVENOUS at 16:45

## 2021-09-19 RX ADMIN — DULOXETINE HYDROCHLORIDE 60 MG: 60 CAPSULE, DELAYED RELEASE ORAL at 09:40

## 2021-09-19 RX ADMIN — ATORVASTATIN CALCIUM 80 MG: 80 TABLET, FILM COATED ORAL at 21:32

## 2021-09-19 RX ADMIN — SODIUM CHLORIDE 100 ML/HR: 9 INJECTION, SOLUTION INTRAVENOUS at 03:15

## 2021-09-19 RX ADMIN — METOPROLOL TARTRATE 5 MG: 1 INJECTION, SOLUTION INTRAVENOUS at 09:07

## 2021-09-19 RX ADMIN — NYSTATIN: 100000 POWDER TOPICAL at 21:40

## 2021-09-19 NOTE — PLAN OF CARE
Goal Outcome Evaluation:              Outcome Summary: Pt is a 55 y/o female admitted to hospital for a breakthrough seizure. Pt's medical history found in chart and significant for seziures, disabetes, and stroke with residual R sided weakness. Pt presents with decreased strength, decreased trunk/postural control, decreased endurance/activity tolerance, SOA, and impaired balance impacting her mobility. Pt presents with significant R sided weakness and tendency to lean R in sitting and standing. Pt perfomred all mobility with min-mod A including taking a few steps left toward HOB with RWX. Pt may benefit from acute skilled PT to address noted deficits and facilitate safe return home.    Patient was intermittently wearing a face mask during this therapy encounter. Therapist used appropriate personal protective equipment including eye protection, mask, and gloves.  Mask used was standard procedure mask. Appropriate PPE was worn during the entire therapy session. Hand hygiene was completed before and after therapy session. Patient is not in enhanced droplet precautions.

## 2021-09-19 NOTE — PROGRESS NOTES
" LOS: 2 days     Name: Amalia Elkins  Age: 56 y.o.  Sex: female  :  1965  MRN: [id number]         Primary Care Physician: Terrence Damon    Subjective   Subjective  Has been under continuous EEG monitoring for the past 24 hours.  A bit confused and lethargic this morning.    Objective   Vital Signs  Temp:  [97.4 °F (36.3 °C)-99.5 °F (37.5 °C)] 99 °F (37.2 °C)  Heart Rate:  [] 99  Resp:  [14-20] 14  BP: (145-162)/() 153/93  Body mass index is 43.27 kg/m².    Objective:  General Appearance:  Comfortable, in no acute distress and ill-appearing.    Vital signs: (most recent): Blood pressure 153/93, pulse 99, temperature 99 °F (37.2 °C), temperature source Oral, resp. rate 14, height 157.5 cm (62\"), weight 107 kg (236 lb 8.9 oz), SpO2 100 %.    Lungs:  Normal effort and normal respiratory rate.  There are decreased breath sounds.    Heart: Normal rate.  Regular rhythm.    Abdomen: Abdomen is soft.  Bowel sounds are normal.   There is no abdominal tenderness.     Extremities: There is no dependent edema or local swelling.    Neurological: (Lethargic and confused).    Skin:  Warm and dry.              Results Review:       I reviewed the patient's new clinical results.    Results from last 7 days   Lab Units 21  0956   WBC 10*3/mm3 9.52   HEMOGLOBIN g/dL 11.7*   PLATELETS 10*3/mm3 216     Results from last 7 days   Lab Units 21  0956   SODIUM mmol/L 138   POTASSIUM mmol/L 3.9   CHLORIDE mmol/L 100   CO2 mmol/L 24.9   BUN mg/dL 7   CREATININE mg/dL 0.70   CALCIUM mg/dL 8.3*   GLUCOSE mg/dL 120*     Results from last 7 days   Lab Units 21  0956   INR  0.97             Scheduled Meds:   aspirin, 81 mg, Oral, Daily  atorvastatin, 80 mg, Oral, Nightly  DULoxetine, 60 mg, Oral, Daily  insulin lispro, 0-7 Units, Subcutaneous, 4x Daily With Meals & Nightly  Lacosamide, 100 mg, Intravenous, Q12H  levETIRAcetam, 1,500 mg, Intravenous, Q12H  metoprolol tartrate, 5 mg, Intravenous, Q8H  nystatin, , " Topical, Q12H  sodium chloride, 10 mL, Intravenous, Q12H      PRN Meds:   •  acetaminophen **OR** acetaminophen **OR** acetaminophen  •  dextrose  •  dextrose  •  glucagon (human recombinant)  •  LORazepam  •  melatonin  •  nitroglycerin  •  ondansetron  •  sodium chloride  Continuous Infusions:  sodium chloride, 100 mL/hr, Last Rate: 100 mL/hr (09/19/21 0315)        Assessment/Plan   Active Hospital Problems    Diagnosis  POA   • **Breakthrough seizure (CMS/HCC) [G40.919]  Yes   • Type 2 diabetes mellitus with hypoglycemia without coma (CMS/HCC) [E11.649]  Yes   • Epilepsy (CMS/HCC) [G40.909]  Yes   • History of ischemic stroke [Z86.73]  Not Applicable   • HTN (hypertension) [I10]  Yes   • Seizure (CMS/HCC) [R56.9]  Yes      Resolved Hospital Problems   No resolved problems to display.       Assessment & Plan    -Management of breakthrough seizures per neurology.    Continues on higher dose Keppra and Vimpat has been added.  Greatly appreciate their assistance.  -Blood sugars well controlled on current regimen  -Needs follow-up labs today.  -Blood pressure is uncontrolled.  Heart rate also running in the low 100s.  Has not been able to take the Norvasc or lisinopril.  Change to scheduled IV metoprolol for now.  -Supportive care with gentle IV fluids.    -Currently n.p.o.  Reinstitute diet once lethargy improved.  If unable to reinstitute diet in the next 24 hours will have to consider cortrak placement.  -Discontinue Henderson catheter once continuous EEG monitoring completed.      I wore full protective equipment throughout the patient encounter including eye protection and facemask.  Hand hygiene was performed before donning protective equipment and after removal when leaving the room.    Sam Fajardo MD  [facility]  09/19/21  08:37 EDT

## 2021-09-19 NOTE — PROGRESS NOTES
"DOS: 2021  NAME: Amalia Elkins   : 1965  PCP: Terrence Damon  No chief complaint on file.      Chief complaint: Seizures  Subjective: No further witnessed seizures.  More alert today.  Right-sided weakness and aphasia are improving    Objective:  Vital signs: /87 (BP Location: Left arm, Patient Position: Lying)   Pulse 96   Temp 97.5 °F (36.4 °C) (Oral)   Resp 20   Ht 157.5 cm (62\")   Wt 107 kg (236 lb 8.9 oz)   SpO2 100%   BMI 43.27 kg/m²    Gen: NAD, vitals reviewed  MS: Lethargic but awake and following commands, moderate motor aphasia  CN: visual acuity grossly normal, PERRL, EOMI, no facial droop, moderate dysarthria  Motor: 4+/5 right upper extremity, right lower extremity, 5/5 left upper and lower extremity  Sensory: intact to light touch all 4 ext.    ROS:  + Weakness, no numbness  No fevers, chills      Laboratory results:  Lab Results   Component Value Date    GLUCOSE 120 (H) 2021    CALCIUM 8.3 (L) 2021     2021    K 3.9 2021    CO2 24.9 2021     2021    BUN 7 2021    CREATININE 0.70 2021    EGFRIFAFRI 105 2021    EGFRIFNONA 87 2021    BCR 10.0 2021    ANIONGAP 13.1 2021     Lab Results   Component Value Date    WBC 8.88 2021    HGB 12.1 2021    HCT 36.3 2021    MCV 86.0 2021     2021     No results found for: LDL      Lab 21  0956   HEMOGLOBIN A1C 5.64*        Review of labs: CBC normal    Continuous EEG result pending    Diagnoses:  Epilepsy, focal onset with complex partial seizures, with status epilepticus, not intractable  History of left anterior cerebral artery stroke    Comment: Seizure seem to have finally aborted on max dose Keppra and Vimpat 100 twice daily.  Anticipate continuing these medications at current doses    Plan:  1.  Continue Keppra 1.5 g twice daily  2.  Continue Vimpat 100 twice daily  3.  Maintain seizure precautions  4.  PT/OT " assessment  5.  Follow-up continuous EEG result    Discussed with patient, nursing staff.

## 2021-09-19 NOTE — PLAN OF CARE
Problem: Adult Inpatient Plan of Care  Goal: Plan of Care Review  Outcome: Ongoing, Progressing  Goal: Patient-Specific Goal (Individualized)  Outcome: Ongoing, Progressing  Goal: Absence of Hospital-Acquired Illness or Injury  Outcome: Ongoing, Progressing  Intervention: Identify and Manage Fall Risk  Recent Flowsheet Documentation  Taken 9/19/2021 0613 by Janene Jose RN  Safety Promotion/Fall Prevention: safety round/check completed  Taken 9/19/2021 0400 by Janene Jose RN  Safety Promotion/Fall Prevention: safety round/check completed  Taken 9/19/2021 0200 by Janene Jose RN  Safety Promotion/Fall Prevention: safety round/check completed  Taken 9/19/2021 0000 by Janene Jose RN  Safety Promotion/Fall Prevention: safety round/check completed  Taken 9/18/2021 2200 by Janene Jose RN  Safety Promotion/Fall Prevention: safety round/check completed  Taken 9/18/2021 2106 by Janene Jose RN  Safety Promotion/Fall Prevention: safety round/check completed  Taken 9/18/2021 2000 by Janene Jose RN  Safety Promotion/Fall Prevention: safety round/check completed  Intervention: Prevent Skin Injury  Recent Flowsheet Documentation  Taken 9/19/2021 0613 by Janene Jose RN  Body Position: position maintained  Taken 9/19/2021 0400 by Janene Jose RN  Body Position:   side-lying, left   turned  Taken 9/19/2021 0200 by Janene Jose RN  Body Position:   supine   turned  Taken 9/19/2021 0000 by Janene Jose RN  Body Position:   side-lying, right   turned  Taken 9/18/2021 2200 by Janene Jose RN  Body Position:   supine   turned  Taken 9/18/2021 2106 by Janene Jose RN  Body Position:   turned   side-lying, left  Skin Protection:   adhesive use limited   tubing/devices free from skin contact  Taken 9/18/2021 2000 by Janene Jose RN  Body Position:   turned   side-lying, left  Goal: Optimal Comfort and Wellbeing  Outcome: Ongoing, Progressing  Intervention: Provide Person-Centered Care  Recent Flowsheet Documentation  Taken 9/18/2021  2106 by Janene Jose RN  Trust Relationship/Rapport: care explained  Goal: Readiness for Transition of Care  Outcome: Ongoing, Progressing     Problem: Diabetes Comorbidity  Goal: Blood Glucose Level Within Desired Range  Outcome: Ongoing, Progressing     Problem: Hypertension Comorbidity  Goal: Blood Pressure in Desired Range  Outcome: Ongoing, Progressing  Intervention: Maintain Hypertension-Management Strategies  Recent Flowsheet Documentation  Taken 9/18/2021 2106 by Janene Jose RN  Medication Review/Management: medications reviewed     Problem: Seizure Disorder Comorbidity  Goal: Maintenance of Seizure Control  Outcome: Ongoing, Progressing     Problem: Skin Injury Risk Increased  Goal: Skin Health and Integrity  Outcome: Ongoing, Progressing  Intervention: Optimize Skin Protection  Recent Flowsheet Documentation  Taken 9/19/2021 0613 by Janene Jose RN  Head of Bed (HOB): HOB at 30-45 degrees  Taken 9/19/2021 0400 by Janene Jose RN  Head of Bed (HOB):   HOB at 30 degrees   HOB at 30-45 degrees  Taken 9/19/2021 0200 by Janene Jsoe RN  Head of Bed (HOB): HOB at 20-30 degrees  Taken 9/19/2021 0000 by Janene Jose RN  Head of Bed (HOB): HOB at 30-45 degrees  Taken 9/18/2021 2200 by Janene Jose RN  Head of Bed (HOB): HOB at 30-45 degrees  Taken 9/18/2021 2106 by Janene Jose RN  Pressure Reduction Techniques: weight shift assistance provided  Head of Bed (HOB): HOB at 30-45 degrees  Pressure Reduction Devices: alternating pressure pump (ADD)  Skin Protection:   adhesive use limited   tubing/devices free from skin contact     Problem: Fall Injury Risk  Goal: Absence of Fall and Fall-Related Injury  Outcome: Ongoing, Progressing  Intervention: Identify and Manage Contributors to Fall Injury Risk  Recent Flowsheet Documentation  Taken 9/18/2021 2106 by Janene Jose RN  Medication Review/Management: medications reviewed  Intervention: Promote Injury-Free Environment  Recent Flowsheet Documentation  Taken 9/19/2021  0613 by Janene Jose RN  Safety Promotion/Fall Prevention: safety round/check completed  Taken 9/19/2021 0400 by Janene Jose RN  Safety Promotion/Fall Prevention: safety round/check completed  Taken 9/19/2021 0200 by Janene Jose RN  Safety Promotion/Fall Prevention: safety round/check completed  Taken 9/19/2021 0000 by Janene Jose RN  Safety Promotion/Fall Prevention: safety round/check completed  Taken 9/18/2021 2200 by Janene Jose RN  Safety Promotion/Fall Prevention: safety round/check completed  Taken 9/18/2021 2106 by Janene Jose RN  Safety Promotion/Fall Prevention: safety round/check completed  Taken 9/18/2021 2000 by Janene Jose RN  Safety Promotion/Fall Prevention: safety round/check completed   Goal Outcome Evaluation:

## 2021-09-19 NOTE — THERAPY EVALUATION
"Patient Name: Amalia Elkins  : 1965    MRN: [id number]                              Today's Date: 2021       Admit Date: 2021    Visit Dx: No diagnosis found.  Patient Active Problem List   Diagnosis   • Type 2 diabetes mellitus with hypoglycemia without coma (CMS/HCC)   • Epilepsy (CMS/HCC)   • History of ischemic stroke   • HTN (hypertension)   • Breakthrough seizure (CMS/HCC)   • Seizure (CMS/HCC)     Past Medical History:   Diagnosis Date   • Diabetes mellitus (CMS/HCC)    • Hypertension    • Stroke (CMS/HCC)      History reviewed. No pertinent surgical history.  General Information     [facility] Name [facility]          Physical Therapy Time and Intention    Document Type  evaluation;therapy note (daily note)  -     Mode of Treatment  physical therapy;individual therapy  -     Row Name 21 1436 21 [facility]       General Information    Patient Profile Reviewed  --  yes  -    Prior Level of Function  -- difficult to assess, pt denied using RWX or SC (RWX listed in chart) but when asked if primarily used WC responded \"I don't know\"  -KH  (S) -- difficult to assess, pt denied using RWX or SC (RWX stated in chart) but when asked if primarily used WC responded \"I don't know\"  -    Existing Precautions/Restrictions  --  (S) fall R sided weakness from hx of stroke  -    Barriers to Rehab  --  none identified  -[facility] Name 21 [facility]          Home Main Entrance    Number of Stairs, Main Entrance  none  -     Row Name 21 [facility]          Stairs Within Home, Primary    Number of Stairs, Within Home, Primary  none  -[facility] Name 21 [facility]          Cognition    Orientation Status (Cognition)  oriented x 3  -[facility] Name 21 [facility]          Safety Issues, Functional Mobility    Impairments Affecting Function (Mobility)  balance;endurance/activity tolerance;motor planning;postural/trunk control;shortness of breath;strength  -     Comment, Safety Issues/Impairments " (Mobility)  fall prevention program maintained  -       User Key  (r) = Recorded By, (t) = Taken By, (c) = Cosigned By    Initials Name Provider Type    Sadia Roberts, MONICA Physical Therapist        Mobility     Row Name 09/19/21 1436          Bed Mobility    Bed Mobility  supine-sit;sit-supine;scooting/bridging  -     Scooting/Bridging [place] (Bed Mobility)  dependent (less than 25% patient effort)  -     Supine-Sit [place] (Bed Mobility)  minimum assist (75% patient effort);moderate assist (50% patient effort);verbal cues;nonverbal cues (demo/gesture)  -     Sit-Supine [place] (Bed Mobility)  minimum assist (75% patient effort);moderate assist (50% patient effort);verbal cues;nonverbal cues (demo/gesture)  -     Assistive Device (Bed Mobility)  bed rails;head of bed elevated;draw sheet  -     Row Name 09/19/21 1436          Sit-Stand Transfer    Sit-Stand [place] (Transfers)  minimum assist (75% patient effort);moderate assist (50% patient effort);verbal cues  -     Assistive Device (Sit-Stand Transfers)  walker, front-wheeled  -     Row Name 09/19/21 1436          Gait/Stairs (Locomotion)    [place] Level (Gait)  minimum assist (75% patient effort);moderate assist (50% patient effort);verbal cues;nonverbal cues (demo/gesture)  -     Assistive Device (Gait)  walker, front-wheeled  -     Distance in Feet (Gait)  -- pt took a few side steps L toward HOB  -     Deviations/Abnormal Patterns (Gait)  right sided deviations;weight shifting decreased  -     Bilateral Gait Deviations  weight shift ability decreased  -     Comment (Gait/Stairs)  pt leans/ falls toward the R and has difficulty weight shifting R to move LLE.  -       User Key  (r) = Recorded By, (t) = Taken By, (c) = Cosigned By    Initials Name Provider Type    Sadia Roberts PT Physical Therapist        Obj/Interventions     Row Name 09/19/21 1438          Range of Motion Comprehensive     Comment, General Range of Motion  BLE gross ROM appear WFLs.  -     Row Name 09/19/21 1438          Strength Comprehensive (MMT)    Comment, General Manual Muscle Testing (MMT) Assessment  Significant RLE weakness observed with functional mobility. Pt unable to move RLE against gravity. LLE gross strength >/=3/5.  -     Row Name 09/19/21 1438          Motor Skills    Therapeutic Exercise  -- 5 reps seated AP and LAQ  -[facility] Name 09/19/21 1438          Balance    Balance Assessment  sitting static balance;standing static balance;standing dynamic balance  -     Static Sitting Balance  WFL;sitting, edge of bed UE support on bed/rail  -     Static Standing Balance  mild impairment;supported  -     Dynamic Standing Balance  mild impairment;moderate impairment;supported  -       User Key  (r) = Recorded By, (t) = Taken By, (c) = Cosigned By    Initials Name Provider Type    Sadia Roberts, PT Physical Therapist        Goals/Plan     [facility] Name 09/19/21 1445          Bed Mobility Goal 1 (PT)    Activity/Assistive Device (Bed Mobility Goal 1, PT)  bed mobility activities, all  -KH     [place] Level/Cues Needed (Bed Mobility Goal 1, PT)  contact guard assist  -KH     Time Frame (Bed Mobility Goal 1, PT)  10 days  -[facility] Name 09/19/21 1445          Transfer Goal 1 (PT)    Activity/Assistive Device (Transfer Goal 1, PT)  transfers, all most appropriate AD  -KH     [place] Level/Cues Needed (Transfer Goal 1, PT)  contact guard assist  -KH     Time Frame (Transfer Goal 1, PT)  10 days  -[facility] Name 09/19/21 1445          Gait Training Goal 1 (PT)    Activity/Assistive Device (Gait Training Goal 1, PT)  gait (walking locomotion) most appropriate AD  -KH     [place] Level (Gait Training Goal 1, PT)  minimum assist (75% or more patient effort)  -KH     Distance (Gait Training Goal 1, PT)  10  -KH     Time Frame (Gait Training Goal 1, PT)  10 days  -       User Key  (r) = Recorded By,  (t) = Taken By, (c) = Cosigned By    Initials Name Provider Type     Sadia Castano, PT Physical Therapist        Clinical Impression     Row Name 09/19/21 1441          Pain    Additional Documentation  Pain Scale: Numbers Pre/Post-Treatment (Group)  -KH     Row Name 09/19/21 1441          Pain Scale: Numbers Pre/Post-Treatment    Pretreatment Pain Rating  0/10 - no pain  -     Posttreatment Pain Rating  0/10 - no pain  -KH     Row Name 09/19/21 1441          Plan of Care Review    Outcome Summary  Pt is a 55 y/o female admitted to hospital for a breakthrough seizure. Pt's medical history found in chart and significant for seziures, disabetes, and stroke with residual R sided weakness. Pt presents with decreased strength, decreased trunk/postural control, decreased endurance/activity tolerance, SOA, and impaired balance impacting her mobility. Pt presents with significant R sided weakness and tendency to lean R in sitting and standing. Pt perfomred all mobility with min-mod A including taking a few steps left toward HOB with RWX. Pt may benefit from acute skilled PT to address noted deficits and facilitate safe return home.  -[facility] Name 09/19/21 1441          Therapy Assessment/Plan (PT)    Patient/Family Therapy Goals Statement (PT)  get better  -     Rehab Potential (PT)  good, to achieve stated therapy goals  -     Criteria for Skilled Interventions Met (PT)  yes;skilled treatment is necessary  -[facility] Name 09/19/21 1441          Vital Signs    O2 Delivery Pre Treatment  supplemental O2  -     Intra SpO2 (%)  86  -KH     O2 Delivery Intra Treatment  room air  -     Post SpO2 (%)  93  -KH     O2 Delivery Post Treatment  supplemental O2  -[facility] Name 09/19/21 1441          Positioning and Restraints    Pre-Treatment Position  in bed  -     Post Treatment Position  bed  -KH     In Bed  notified nsg;fowlers;call light within reach;encouraged to call for assist;exit alarm on  -        User Key  (r) = Recorded By, (t) = Taken By, (c) = Cosigned By    Initials Name Provider Type    Sadia Roberts, MONICA Physical Therapist        Outcome Measures     Row Name 09/19/21 1446          How much help from another person do you currently need...    Turning from your back to your side while in flat bed without using bedrails?  2  -KH     Moving from lying on back to sitting on the side of a flat bed without bedrails?  2  -KH     Moving to and from a bed to a chair (including a wheelchair)?  2  -KH     Standing up from a chair using your arms (e.g., wheelchair, bedside chair)?  3  -KH     Climbing 3-5 steps with a railing?  1  -KH     To walk in hospital room?  2  -     AM-PAC 6 Clicks Score (PT)  12  -     Row Name 09/19/21 1446          Functional Assessment    Outcome Measure Options  AM-PAC 6 Clicks Basic Mobility (PT)  -       User Key  (r) = Recorded By, (t) = Taken By, (c) = Cosigned By    Initials Name Provider Type    Sadia Roberts, MONICA Physical Therapist                       Physical Therapy Education                 Title: PT OT SLP Therapies (Done)     Topic: Physical Therapy (Done)     Point: Mobility training (Done)     Learning Progress Summary           Patient Acceptance, E, VU,NR by  at 9/19/2021 1447                   Point: Home exercise program (Done)     Learning Progress Summary           Patient Acceptance, E, VU,NR by  at 9/19/2021 1447                   Point: Body mechanics (Done)     Learning Progress Summary           Patient Acceptance, E, VU,NR by  at 9/19/2021 1447                   Point: Precautions (Done)     Learning Progress Summary           Patient Acceptance, E, VU,NR by  at 9/19/2021 1447                               User Key     Initials Effective Dates Name Provider Type Discipline     06/16/21 -  Sadia Castano, MONICA Physical Therapist PT              PT Recommendation and Plan  Planned Therapy Interventions (PT): balance training, bed  mobility training, gait training, home exercise program, patient/family education, strengthening, transfer training  Outcome Summary: Pt is a 57 y/o female admitted to hospital for a breakthrough seizure. Pt's medical history found in chart and significant for seziures, disabetes, and stroke with residual R sided weakness. Pt presents with decreased strength, decreased trunk/postural control, decreased endurance/activity tolerance, SOA, and impaired balance impacting her mobility. Pt presents with significant R sided weakness and tendency to lean R in sitting and standing. Pt perfomred all mobility with min-mod A including taking a few steps left toward HOB with RWX. Pt may benefit from acute skilled PT to address noted deficits and facilitate safe return home.     Time Calculation:   PT Charges     Row Name 09/19/21 1448             Time Calculation    Start Time  1404  -KH      Stop Time  1432  -KH      Time Calculation (min)  28 min  -KH      PT Non-Billable Time (min)  10 min  -KH      PT Received On  09/19/21  -KH      PT - Next Appointment  09/20/21  -KH      PT Goal Re-Cert Due Date  09/29/21  -KH         Timed Charges    92163 - PT Therapeutic Exercise Minutes  20  -KH         Untimed Charges    PT Eval/Re-eval Minutes  8  -KH         Total Minutes    Timed Charges Total Minutes  20  -KH      Untimed Charges Total Minutes  8  -KH       Total Minutes  28  -KH        User Key  (r) = Recorded By, (t) = Taken By, (c) = Cosigned By    Initials Name Provider Type    Sadia Roberts, PT Physical Therapist        Therapy Charges for Today     Code Description Service Date Service Provider Modifiers Qty    90339130554 HC PT THER PROC EA 15 MIN 9/19/2021 Sadia Castano, PT GP 1    53813474699 HC PT EVAL LOW COMPLEXITY 2 9/19/2021 Sadia Castano, PT GP 1          PT G-Codes  Outcome Measure Options: AM-PAC 6 Clicks Basic Mobility (PT)  AM-PAC 6 Clicks Score (PT): 12    Sadia Castano PT  9/19/2021

## 2021-09-20 LAB
ANION GAP SERPL CALCULATED.3IONS-SCNC: 11 MMOL/L (ref 5–15)
BUN SERPL-MCNC: 9 MG/DL (ref 6–20)
BUN/CREAT SERPL: 13 (ref 7–25)
CALCIUM SPEC-SCNC: 8.3 MG/DL (ref 8.6–10.5)
CHLORIDE SERPL-SCNC: 105 MMOL/L (ref 98–107)
CO2 SERPL-SCNC: 25 MMOL/L (ref 22–29)
CREAT SERPL-MCNC: 0.69 MG/DL (ref 0.57–1)
DEPRECATED RDW RBC AUTO: 44.1 FL (ref 37–54)
ERYTHROCYTE [DISTWIDTH] IN BLOOD BY AUTOMATED COUNT: 13.3 % (ref 12.3–15.4)
GFR SERPL CREATININE-BSD FRML MDRD: 107 ML/MIN/1.73
GFR SERPL CREATININE-BSD FRML MDRD: 88 ML/MIN/1.73
GLUCOSE BLDC GLUCOMTR-MCNC: 113 MG/DL (ref 70–130)
GLUCOSE BLDC GLUCOMTR-MCNC: 123 MG/DL (ref 70–130)
GLUCOSE BLDC GLUCOMTR-MCNC: 141 MG/DL (ref 70–130)
GLUCOSE BLDC GLUCOMTR-MCNC: 182 MG/DL (ref 70–130)
GLUCOSE SERPL-MCNC: 92 MG/DL (ref 65–99)
HCT VFR BLD AUTO: 38.5 % (ref 34–46.6)
HGB BLD-MCNC: 12 G/DL (ref 12–15.9)
MCH RBC QN AUTO: 27.9 PG (ref 26.6–33)
MCHC RBC AUTO-ENTMCNC: 31.2 G/DL (ref 31.5–35.7)
MCV RBC AUTO: 89.5 FL (ref 79–97)
PLATELET # BLD AUTO: 220 10*3/MM3 (ref 140–450)
PMV BLD AUTO: 10.4 FL (ref 6–12)
POTASSIUM SERPL-SCNC: 3.5 MMOL/L (ref 3.5–5.2)
RBC # BLD AUTO: 4.3 10*6/MM3 (ref 3.77–5.28)
SARS-COV-2 ORF1AB RESP QL NAA+PROBE: NOT DETECTED
SODIUM SERPL-SCNC: 141 MMOL/L (ref 136–145)
WBC # BLD AUTO: 7.56 10*3/MM3 (ref 3.4–10.8)

## 2021-09-20 PROCEDURE — 92526 ORAL FUNCTION THERAPY: CPT

## 2021-09-20 PROCEDURE — 85027 COMPLETE CBC AUTOMATED: CPT | Performed by: INTERNAL MEDICINE

## 2021-09-20 PROCEDURE — U0004 COV-19 TEST NON-CDC HGH THRU: HCPCS | Performed by: INTERNAL MEDICINE

## 2021-09-20 PROCEDURE — 82962 GLUCOSE BLOOD TEST: CPT

## 2021-09-20 PROCEDURE — 97535 SELF CARE MNGMENT TRAINING: CPT

## 2021-09-20 PROCEDURE — 97110 THERAPEUTIC EXERCISES: CPT

## 2021-09-20 PROCEDURE — 25010000003 LEVETIRACETAM IN NACL 0.54% 1500 MG/100ML SOLUTION: Performed by: PSYCHIATRY & NEUROLOGY

## 2021-09-20 PROCEDURE — 97530 THERAPEUTIC ACTIVITIES: CPT

## 2021-09-20 PROCEDURE — 99231 SBSQ HOSP IP/OBS SF/LOW 25: CPT | Performed by: PSYCHIATRY & NEUROLOGY

## 2021-09-20 PROCEDURE — 97166 OT EVAL MOD COMPLEX 45 MIN: CPT

## 2021-09-20 PROCEDURE — 80048 BASIC METABOLIC PNL TOTAL CA: CPT | Performed by: INTERNAL MEDICINE

## 2021-09-20 PROCEDURE — U0005 INFEC AGEN DETEC AMPLI PROBE: HCPCS | Performed by: INTERNAL MEDICINE

## 2021-09-20 RX ORDER — AMLODIPINE BESYLATE 5 MG/1
5 TABLET ORAL
Status: DISCONTINUED | OUTPATIENT
Start: 2021-09-20 | End: 2021-09-21 | Stop reason: HOSPADM

## 2021-09-20 RX ORDER — LEVETIRACETAM 500 MG/1
2000 TABLET ORAL EVERY 12 HOURS SCHEDULED
Status: DISCONTINUED | OUTPATIENT
Start: 2021-09-20 | End: 2021-09-21 | Stop reason: HOSPADM

## 2021-09-20 RX ORDER — LISINOPRIL 40 MG/1
40 TABLET ORAL
Status: DISCONTINUED | OUTPATIENT
Start: 2021-09-20 | End: 2021-09-21 | Stop reason: HOSPADM

## 2021-09-20 RX ORDER — LACOSAMIDE 100 MG/1
100 TABLET ORAL EVERY 12 HOURS SCHEDULED
Status: DISCONTINUED | OUTPATIENT
Start: 2021-09-20 | End: 2021-09-21 | Stop reason: HOSPADM

## 2021-09-20 RX ORDER — LEVETIRACETAM 500 MG/1
1500 TABLET ORAL EVERY 12 HOURS SCHEDULED
Status: DISCONTINUED | OUTPATIENT
Start: 2021-09-20 | End: 2021-09-20

## 2021-09-20 RX ADMIN — SODIUM CHLORIDE, PRESERVATIVE FREE 10 ML: 5 INJECTION INTRAVENOUS at 21:18

## 2021-09-20 RX ADMIN — METOPROLOL TARTRATE 5 MG: 1 INJECTION, SOLUTION INTRAVENOUS at 00:56

## 2021-09-20 RX ADMIN — LEVETIRACETAM 2000 MG: 500 TABLET, FILM COATED ORAL at 21:58

## 2021-09-20 RX ADMIN — NYSTATIN: 100000 POWDER TOPICAL at 10:47

## 2021-09-20 RX ADMIN — LACOSAMIDE 100 MG: 100 TABLET, FILM COATED ORAL at 21:16

## 2021-09-20 RX ADMIN — SODIUM CHLORIDE 100 MG: 9 INJECTION, SOLUTION INTRAVENOUS at 10:46

## 2021-09-20 RX ADMIN — DULOXETINE HYDROCHLORIDE 60 MG: 60 CAPSULE, DELAYED RELEASE ORAL at 10:25

## 2021-09-20 RX ADMIN — ASPIRIN 81 MG: 81 TABLET, CHEWABLE ORAL at 10:25

## 2021-09-20 RX ADMIN — NYSTATIN: 100000 POWDER TOPICAL at 21:16

## 2021-09-20 RX ADMIN — AMLODIPINE BESYLATE 5 MG: 5 TABLET ORAL at 10:25

## 2021-09-20 RX ADMIN — METOPROLOL TARTRATE 12.5 MG: 25 TABLET, FILM COATED ORAL at 21:16

## 2021-09-20 RX ADMIN — SODIUM CHLORIDE, PRESERVATIVE FREE 10 ML: 5 INJECTION INTRAVENOUS at 10:46

## 2021-09-20 RX ADMIN — SODIUM CHLORIDE 75 ML/HR: 9 INJECTION, SOLUTION INTRAVENOUS at 19:45

## 2021-09-20 RX ADMIN — METOPROLOL TARTRATE 12.5 MG: 25 TABLET, FILM COATED ORAL at 10:46

## 2021-09-20 RX ADMIN — ATORVASTATIN CALCIUM 80 MG: 80 TABLET, FILM COATED ORAL at 21:16

## 2021-09-20 RX ADMIN — LISINOPRIL 40 MG: 40 TABLET ORAL at 10:25

## 2021-09-20 RX ADMIN — LEVETIRACETAM 1500 MG: 15 INJECTION INTRAVENOUS at 10:25

## 2021-09-20 NOTE — THERAPY EVALUATION
Patient Name: Amalia Elkins  : 1965    MRN: [id number]                              Today's Date: 2021       Admit Date: 2021    Visit Dx: No diagnosis found.  Patient Active Problem List   Diagnosis   • Type 2 diabetes mellitus with hypoglycemia without coma (CMS/HCC)   • Epilepsy (CMS/HCC)   • History of ischemic stroke   • HTN (hypertension)   • Breakthrough seizure (CMS/HCC)   • Seizure (CMS/HCC)     Past Medical History:   Diagnosis Date   • Diabetes mellitus (CMS/HCC)    • Hypertension    • Stroke (CMS/HCC)      History reviewed. No pertinent surgical history.  General Information     Row Name 21 1040          OT Time and Intention    Document Type  evaluation  -RB     Mode of Treatment  individual therapy;occupational therapy  -RB     Row Name 21 1040          General Information    Patient Profile Reviewed  yes  -RB     Prior Level of Function  min assist:;ADL's;transfer  -RB     Existing Precautions/Restrictions  fall  -RB     Barriers to Rehab  medically complex;previous functional deficit  -RB     Row Name 21 1040          Living Environment    Lives With  child(jazzy), dependent;child(jazzy), adult  -RB     Row Name 21 1040          Home Main Entrance    Number of Stairs, Main Entrance  none  -RB     Row Name 21 1040          Cognition    Orientation Status (Cognition)  oriented x 3  -RB     Row Name 21 1040          Safety Issues, Functional Mobility    Safety Issues Affecting Function (Mobility)  awareness of need for assistance;insight into deficits/self-awareness;safety precaution awareness;safety precautions follow-through/compliance;positioning of assistive device  -RB     Impairments Affecting Function (Mobility)  balance;endurance/activity tolerance;strength;shortness of breath;postural/trunk control;motor planning;coordination  -RB       User Key  (r) = Recorded By, (t) = Taken By, (c) = Cosigned By    Initials Name Provider Type    RB Ania  MORRIS Taylor Occupational Therapist          Mobility/ADL's     Row Name 09/20/21 1042          Bed Mobility    Bed Mobility  supine-sit  -RB     Scooting/Bridging [place] (Bed Mobility)  minimum assist (75% patient effort);2 person assist;verbal cues  -RB     Assistive Device (Bed Mobility)  bed rails;head of bed elevated  -RB     Row Name 09/20/21 1042          Transfers    Transfers  sit-stand transfer;toilet transfer;bed-chair transfer  -RB     Bed-Chair [place] (Transfers)  minimum assist (75% patient effort);verbal cues;nonverbal cues (demo/gesture);2 person assist  -RB     Assistive Device (Bed-Chair Transfers)  walker, front-wheeled  -RB     Sit-Stand [place] (Transfers)  minimum assist (75% patient effort);verbal cues;nonverbal cues (demo/gesture);2 person assist  -RB     [place] Level (Toilet Transfer)  minimum assist (75% patient effort);verbal cues;nonverbal cues (demo/gesture);2 person assist  -RB     Assistive Device (Toilet Transfer)  walker, front-wheeled  -RB     Row Name 09/20/21 1042          Sit-Stand Transfer    Assistive Device (Sit-Stand Transfers)  walker, front-wheeled  -RB     Row Name 09/20/21 1042          Toilet Transfer    Type (Toilet Transfer)  stand-sit;sit-stand  -     Row Name 09/20/21 1042          Functional Mobility    Functional Mobility- Ind. Level  minimum assist (75% patient effort);2 person assist required;verbal cues required;nonverbal cues required (demo/gesture)  -     Functional Mobility- Device  rolling walker  -RB     Functional Mobility- Safety Issues  balance decreased during turns;sequencing ability decreased;step length decreased;weight-shifting ability decreased;loses balance backward  -RB     Row Name 09/20/21 1042          Activities of Daily Living    BADL Assessment/Intervention  toileting  -     Row Name 09/20/21 1042          Toileting Assessment/Training    [place] Level (Toileting)  maximum assist (25% patient effort)  -      Position (Toileting)  supine;supported sitting  -RB       User Key  (r) = Recorded By, (t) = Taken By, (c) = Cosigned By    Initials Name Provider Type    RB Claudia Jorge OT Occupational Therapist        Obj/Interventions     Row Name 09/20/21 1043          Sensory Assessment (Somatosensory)    Sensory Assessment (Somatosensory)  sensation intact  -RB     Row Name 09/20/21 1043          Vision Assessment/Intervention    Visual Impairment/Limitations  WFL  -RB     Row Name 09/20/21 1043          Strength Comprehensive (MMT)    Comment, General Manual Muscle Testing (MMT) Assessment  RUE weakness from previous stroke.  -RB     Row Name 09/20/21 1043          Balance    Balance Assessment  sitting static balance;sitting dynamic balance;standing static balance;standing dynamic balance  -RB     Static Sitting Balance  WFL;supported;sitting, edge of bed  -RB     Dynamic Sitting Balance  WFL;supported;sitting, edge of bed  -RB     Static Standing Balance  mild impairment;supported;standing  -RB     Dynamic Standing Balance  mild impairment;moderate impairment;supported;standing  -RB     Balance Interventions  occupation based/functional task  -RB     Comment, Balance  Lost balance backwards during mobility with use of walker  -RB       User Key  (r) = Recorded By, (t) = Taken By, (c) = Cosigned By    Initials Name Provider Type    Claudia Suárez OT Occupational Therapist        Goals/Plan     Row Name 09/20/21 1045          Bed Mobility Goal 1 (OT)    Activity/Assistive Device (Bed Mobility Goal 1, OT)  bed mobility activities, all  -RB     [place] Level/Cues Needed (Bed Mobility Goal 1, OT)  supervision required  -RB     Time Frame (Bed Mobility Goal 1, OT)  short term goal (STG);2 weeks  -RB     Progress/Outcomes (Bed Mobility Goal 1, OT)  continuing progress toward goal  -RB     Row Name 09/20/21 1045          Transfer Goal 1 (OT)    Activity/Assistive Device (Transfer Goal 1, OT)  transfers, all   -RB     [place] Level/Cues Needed (Transfer Goal 1, OT)  supervision required  -RB     Time Frame (Transfer Goal 1, OT)  short term goal (STG);2 weeks  -RB     Progress/Outcome (Transfer Goal 1, OT)  continuing progress toward goal  -RB     Row Name 09/20/21 1045          Bathing Goal 1 (OT)    Activity/Device (Bathing Goal 1, OT)  bathing skills, all  -RB     [place] Level/Cues Needed (Bathing Goal 1, OT)  minimum assist (75% or more patient effort)  -RB     Time Frame (Bathing Goal 1, OT)  short term goal (STG);2 weeks  -RB     Progress/Outcomes (Bathing Goal 1, OT)  continuing progress toward goal  -RB     Row Name 09/20/21 1045          Dressing Goal 1 (OT)    Activity/Device (Dressing Goal 1, OT)  dressing skills, all  -RB     [place]/Cues Needed (Dressing Goal 1, OT)  minimum assist (75% or more patient effort)  -RB     Time Frame (Dressing Goal 1, OT)  short term goal (STG);2 weeks  -RB     Progress/Outcome (Dressing Goal 1, OT)  continuing progress toward goal  -RB     Row Name 09/20/21 1045          Toileting Goal 1 (OT)    Activity/Device (Toileting Goal 1, OT)  toileting skills, all  -RB     [place] Level/Cues Needed (Toileting Goal 1, OT)  minimum assist (75% or more patient effort)  -RB     Time Frame (Toileting Goal 1, OT)  short term goal (STG);2 weeks  -RB     Progress/Outcome (Toileting Goal 1, OT)  continuing progress toward goal  -RB     Row Name 09/20/21 1045          Grooming Goal 1 (OT)    Activity/Device (Grooming Goal 1, OT)  grooming skills, all  -RB     [place] (Grooming Goal 1, OT)  supervision required  -RB     Time Frame (Grooming Goal 1, OT)  short term goal (STG);2 weeks  -RB     Progress/Outcome (Grooming Goal 1, OT)  continuing progress toward goal  -RB     Row Name 09/20/21 1045          Therapy Assessment/Plan (OT)    Planned Therapy Interventions (OT)  activity tolerance training;adaptive equipment training;BADL retraining;transfer/mobility  retraining;strengthening exercise;ROM/therapeutic exercise;patient/caregiver education/training;occupation/activity based interventions;functional balance retraining;cognitive/visual perception retraining;neuromuscular control/coordination retraining  -RB       User Key  (r) = Recorded By, (t) = Taken By, (c) = Cosigned By    Initials Name Provider Type    RB Claudia Jorge, OT Occupational Therapist        Clinical Impression     Row Name 09/20/21 1044          Pain Scale: Numbers Pre/Post-Treatment    Pretreatment Pain Rating  0/10 - no pain  -RB     Posttreatment Pain Rating  0/10 - no pain  -RB     Row Name 09/20/21 1044          Plan of Care Review    Plan of Care Reviewed With  patient  -RB     Progress  no change  -RB     Row Name 09/20/21 1044          Therapy Assessment/Plan (OT)    Rehab Potential (OT)  good, to achieve stated therapy goals  -RB     Criteria for Skilled Therapeutic Interventions Met (OT)  yes;skilled treatment is necessary  -RB     Therapy Frequency (OT)  5 times/wk  -RB     Row Name 09/20/21 1044          Therapy Plan Review/Discharge Plan (OT)    Anticipated Discharge Disposition (OT)  inpatient rehabilitation facility;skilled nursing facility  -RB     Row Name 09/20/21 1044          Vital Signs    Intra Systolic BP Rehab  175  -RB     Intra Treatment Diastolic BP  103  -RB     Post Systolic BP Rehab  162  -RB     Post Treatment Diastolic BP  98  -RB     Pre SpO2 (%)  98  -RB     O2 Delivery Pre Treatment  supplemental O2  -RB     Intra SpO2 (%)  93  -RB     O2 Delivery Intra Treatment  room air  -RB     Post SpO2 (%)  95  -RB     O2 Delivery Post Treatment  room air  -RB     Pre Patient Position  Supine  -RB     Intra Patient Position  Standing  -RB     Post Patient Position  Sitting  -RB     Row Name 09/20/21 1044          Positioning and Restraints    Pre-Treatment Position  in bed  -RB     Post Treatment Position  chair  -RB     In Chair  notified nsg;reclined;sitting;call light  within reach;encouraged to call for assist;exit alarm on;legs elevated  -RB       User Key  (r) = Recorded By, (t) = Taken By, (c) = Cosigned By    Initials Name Provider Type    Claudia Suárez OT Occupational Therapist        Outcome Measures     Row Name 09/20/21 1046          How much help from another is currently needed...    Putting on and taking off regular lower body clothing?  2  -RB     Bathing (including washing, rinsing, and drying)  2  -RB     Toileting (which includes using toilet bed pan or urinal)  2  -RB     Putting on and taking off regular upper body clothing  2  -RB     Taking care of personal grooming (such as brushing teeth)  3  -RB     Eating meals  3  -RB     AM-PAC 6 Clicks Score (OT)  14  -RB     Row Name 09/20/21 1046          Modified [place] Scale    Modified [place] Scale  4 - Moderately severe disability.  Unable to walk without assistance, and unable to attend to own bodily needs without assistance.  -RB     Row Name 09/20/21 1046          Functional Assessment    Outcome Measure Options  AM-PAC 6 Clicks Daily Activity (OT);Modified [place]  -RB       User Key  (r) = Recorded By, (t) = Taken By, (c) = Cosigned By    Initials Name Provider Type    Claudia Suárez OT Occupational Therapist          Occupational Therapy Education                 Title: PT OT SLP Therapies (Done)     Topic: Occupational Therapy (Done)     Point: ADL training (Done)     Description:   Instruct learner(s) on proper safety adaptation and remediation techniques during self care or transfers.   Instruct in proper use of assistive devices.              Learning Progress Summary           Patient Acceptance, E,TB,D, DU,VU,NR by RB at 9/20/2021 1047    Comment: Rehab at d/c  - safety awareness, BSC transfer with staff                   Point: Home exercise program (Done)     Description:   Instruct learner(s) on appropriate technique for monitoring, assisting and/or progressing therapeutic  exercises/activities.              Learning Progress Summary           Patient Acceptance, E,TB,D, DU,VU,NR by RB at 9/20/2021 1047    Comment: Rehab at [facility] safety [facility], BSC transfer with staff                   Point: Precautions (Done)     Description:   Instruct learner(s) on prescribed precautions during self-care and functional transfers.              Learning Progress Summary           Patient Acceptance, E,TB,D, DU,VU,NR by RB at 9/20/2021 1047    Comment: Rehab at /formerly [facility], BSC transfer with staff                   Point: Body mechanics (Done)     Description:   Instruct learner(s) on proper positioning and spine alignment during self-care, functional mobility activities and/or exercises.              Learning Progress Summary           Patient Acceptance, E,TB,D, DU,VU,NR by RB at 9/20/2021 1047    Comment: Rehab at [facility], BSC transfer with staff                               User Key     Initials Effective Dates Name Provider Type Discipline     06/16/21 -  Claudia Jorge OT Occupational Therapist OT              OT Recommendation and Plan  Planned Therapy Interventions (OT): activity tolerance training, adaptive equipment training, BADL retraining, transfer/mobility retraining, strengthening exercise, ROM/therapeutic exercise, patient/caregiver education/training, occupation/activity based interventions, functional balance retraining, cognitive/visual perception retraining, neuromuscular control/coordination retraining  Therapy Frequency (OT): 5 times/wk  Plan of Care Review  Plan of Care Reviewed With: patient  Progress: no change     Time Calculation:   Time Calculation- OT     Row Name 09/20/21 1038             Time Calculation- OT    OT Start Time  0952  -RB      OT Stop Time  1024  -RB      OT Time Calculation (min)  32 min  -RB      Total Timed Code Minutes- OT  24 minute(s)  -RB      OT Received On  09/20/21  -RB      OT - Next Appointment  09/21/21   -RB      OT Goal Re-Cert Due Date  10/04/21  -RB         Timed Charges    68703 - OT Therapeutic Activity Minutes  10  -RB      28884 - OT Self Care/Mgmt Minutes  14  -RB         Untimed Charges    OT Eval/Re-eval Minutes  8  -RB         Total Minutes    Timed Charges Total Minutes  24  -RB      Untimed Charges Total Minutes  8  -RB       Total Minutes  32  -RB        User Key  (r) = Recorded By, (t) = Taken By, (c) = Cosigned By    Initials Name Provider Type    RB Claudia Jorge OT Occupational Therapist        Therapy Charges for Today     Code Description Service Date Service Provider Modifiers Qty    16081184656 HC OT EVAL MOD COMPLEXITY 2 9/20/2021 Claudia Jorge OT GO 1    35076748757 HC OT SELF CARE/MGMT/TRAIN EA 15 MIN 9/20/2021 Claudia Jorge OT GO 1    37470063242 HC OT THERAPEUTIC ACT EA 15 MIN 9/20/2021 Claudia Jorge OT GO 1               Claudia Jorge OT  9/20/2021

## 2021-09-20 NOTE — PROGRESS NOTES
" LOS: 3 days     Name: Amalia Elkins  Age: 56 y.o.  Sex: female  :  1965  MRN: [id number]         Primary Care Physician: Terrence Damon    Subjective   Subjective  Seizures appear to have abated and she is now more awake and alert.  More conversant.  Confusion resolved.  Still with some right-sided weakness.  Aphasia seems better today.    Objective   Vital Signs  Temp:  [97.5 °F (36.4 °C)-98.8 °F (37.1 °C)] 97.8 °F (36.6 °C)  Heart Rate:  [] 93  Resp:  [14-20] 14  BP: (136-162)/() 162/100  Body mass index is 43.27 kg/m².    Objective:  General Appearance:  Comfortable, in no acute distress and ill-appearing.    Vital signs: (most recent): Blood pressure 162/100, pulse 93, temperature 97.8 °F (36.6 °C), temperature source Oral, resp. rate 14, height 157.5 cm (62\"), weight 107 kg (236 lb 8.9 oz), SpO2 95 %.    Lungs:  Normal effort and normal respiratory rate.  There are decreased breath sounds.    Heart: Normal rate.  Regular rhythm.    Abdomen: Abdomen is soft.  Bowel sounds are normal.   There is no abdominal tenderness.     Extremities: There is no dependent edema or local swelling.    Neurological: Patient is alert and oriented to person, place and time.    Skin:  Warm and dry.              Results Review:       I reviewed the patient's new clinical results.    Results from last 7 days   Lab Units 21  0956   WBC 10*3/mm3 7.56 8.88 9.52   HEMOGLOBIN g/dL 12.0 12.1 11.7*   PLATELETS 10*3/mm3 220 219 216     Results from last 7 days   Lab Units 21  0621  0930 21  0956   SODIUM mmol/L 141 143 138   POTASSIUM mmol/L 3.5 3.5 3.9   CHLORIDE mmol/L 105 104 100   CO2 mmol/L 25.0 25.3 24.9   BUN mg/dL 9 8 7   CREATININE mg/dL 0.69 0.71 0.70   CALCIUM mg/dL 8.3* 8.6 8.3*   GLUCOSE mg/dL 92 94 120*     Results from last 7 days   Lab Units 21  0956   INR  0.97             Scheduled Meds:   amLODIPine, 5 mg, Oral, Q24H  aspirin, 81 mg, Oral, " Daily  atorvastatin, 80 mg, Oral, Nightly  DULoxetine, 60 mg, Oral, Daily  insulin lispro, 0-7 Units, Subcutaneous, 4x Daily With Meals & Nightly  Lacosamide, 100 mg, Intravenous, Q12H  levETIRAcetam, 1,500 mg, Intravenous, Q12H  lisinopril, 40 mg, Oral, Q24H  metoprolol tartrate, 12.5 mg, Oral, Q12H  nystatin, , Topical, Q12H  sodium chloride, 10 mL, Intravenous, Q12H      PRN Meds:   •  acetaminophen **OR** acetaminophen **OR** acetaminophen  •  dextrose  •  dextrose  •  glucagon (human recombinant)  •  LORazepam  •  melatonin  •  nitroglycerin  •  ondansetron  •  sodium chloride  Continuous Infusions:  sodium chloride, 75 mL/hr, Last Rate: 100 mL/hr (09/19/21 0315)        Assessment/Plan   Active Hospital Problems    Diagnosis  POA   • **Breakthrough seizure (CMS/HCC) [G40.919]  Yes   • Type 2 diabetes mellitus with hypoglycemia without coma (CMS/HCC) [E11.649]  Yes   • Epilepsy (CMS/HCC) [G40.909]  Yes   • History of ischemic stroke [Z86.73]  Not Applicable   • HTN (hypertension) [I10]  Yes   • Seizure (CMS/HCC) [R56.9]  Yes      Resolved Hospital Problems   No resolved problems to display.       Assessment & Plan    -Management of breakthrough seizures per neurology.   Seem to have abated at this point. Continues on high dose Keppra and Vimpat.  Greatly appreciate their assistance.  -Blood sugars well controlled on current regimen  -Blood pressure running high.  I will add back her oral medications now that seizures have stopped.  -Supportive care with gentle IV fluids, decrease rate today and discontinue once diet fully restored   -Start clear liquids and advance as tolerated today  -PT/OT      I wore full protective equipment throughout the patient encounter including eye protection and facemask.  Hand hygiene was performed before donning protective equipment and after removal when leaving the room.    Sam Fajardo MD  [facility]  09/20/21  08:28 EDT

## 2021-09-20 NOTE — THERAPY TREATMENT NOTE
Patient Name: Amalia Elkins  : 1965    MRN: [id number]                              Today's Date: 2021       Admit Date: 2021    Visit Dx: No diagnosis found.  Patient Active Problem List   Diagnosis   • Type 2 diabetes mellitus with hypoglycemia without coma (CMS/HCC)   • Epilepsy (CMS/HCC)   • History of ischemic stroke   • HTN (hypertension)   • Breakthrough seizure (CMS/HCC)   • Seizure (CMS/HCC)     Past Medical History:   Diagnosis Date   • Diabetes mellitus (CMS/HCC)    • Hypertension    • Stroke (CMS/HCC)      History reviewed. No pertinent surgical history.  General Information     Row Name 21 1055          Physical Therapy Time and Intention    Document Type  therapy note (daily note)  (Pended)   -CB     Mode of Treatment  co-treatment;physical therapy;occupational therapy  (Pended)   -CB     Row Name 21 1055          General Information    Patient Profile Reviewed  yes  (Pended)   -CB     Prior Level of Function  min assist:;ADL's;transfer  (Pended)   -CB     Existing Precautions/Restrictions  fall  (Pended)   -CB     Row Name 21 1055          Safety Issues, Functional Mobility    Impairments Affecting Function (Mobility)  balance;endurance/activity tolerance;strength;shortness of breath;postural/trunk control;motor planning;coordination  (Pended)   -CB       User Key  (r) = Recorded By, (t) = Taken By, (c) = Cosigned By    Initials Name Provider Type    CB Len Campbell, PT Student PT Student        Mobility     Row Name 21 1056          Bed Mobility    Bed Mobility  supine-sit  (Pended)   -CB     Supine-Sit [place] (Bed Mobility)  minimum assist (75% patient effort);verbal cues;nonverbal cues (demo/gesture)  (Pended)   -CB     Assistive Device (Bed Mobility)  bed rails;head of bed elevated  (Pended)   -CB     Comment (Bed Mobility)  Increased time, verbal cues for sequencing.  (Pended)   -CB     Row Name 21 1056          Transfers    Comment (Transfers)   STS 2x for transfer to bedside commode, then transfer to ambulate and return to chair.  (Pended)   -CB     Row Name 09/20/21 1056          Bed-Chair Transfer    Bed-Chair [place] (Transfers)  minimum assist (75% patient effort);verbal cues;nonverbal cues (demo/gesture);2 person assist  (Pended)   -CB     Assistive Device (Bed-Chair Transfers)  walker, front-wheeled  (Pended)   -CB     Row Name 09/20/21 1056          Sit-Stand Transfer    Sit-Stand [place] (Transfers)  minimum assist (75% patient effort);verbal cues;nonverbal cues (demo/gesture);2 person assist  (Pended)   -CB     Assistive Device (Sit-Stand Transfers)  walker, front-wheeled  (Pended)   -CB     Row Name 09/20/21 1056          Gait/Stairs (Locomotion)    [place] Level (Gait)  minimum assist (75% patient effort);verbal cues;nonverbal cues (demo/gesture)  (Pended)   -CB     Assistive Device (Gait)  walker, front-wheeled  (Pended)   -CB     Distance in Feet (Gait)  25  (Pended)   -CB     Deviations/Abnormal Patterns (Gait)  right sided deviations;weight shifting decreased;ha decreased;stride length decreased  (Pended)   -CB     Bilateral Gait Deviations  forward flexed posture  (Pended)   -CB     Comment (Gait/Stairs)  R side lean, needed assistance for walker management.  (Pended)   -CB       User Key  (r) = Recorded By, (t) = Taken By, (c) = Cosigned By    Initials Name Provider Type    CB Len Campbell, PT Student PT Student        Obj/Interventions    No documentation.       Goals/Plan    No documentation.       Clinical Impression     Row Name 09/20/21 1055          Pain Scale: Numbers Pre/Post-Treatment    Pretreatment Pain Rating  0/10 - no pain  (Pended)   -CB     Posttreatment Pain Rating  0/10 - no pain  (Pended)   -CB     Row Name 09/20/21 1051          Plan of Care Review    Plan of Care Reviewed With  patient  (Pended)   -CB     Progress  improving  (Pended)   -CB     Outcome Summary  Pt agreeable to be co-treated with PT  and OT this am. Pt needed min/mod A x2 for bed mobility to sit EOB with patient demo increased lean to the R in sitting. Pt performed stand pivot transfer to [facility] with miin A x2 and able to support self in sitting for several minutes before standing again to ambulate. Pt ambulated 25 ft. with min A x2 with R sided weakness and lean towards the R with assistance needed for walker management. PT to continue to address functional mobility deficits. Pt hopeful to return home with assist from her daughters but would likely benefit from continued rehab before returning home.  (Pended)   -CB     Row Name 09/20/21 1059          Positioning and Restraints    Pre-Treatment Position  in bed  (Pended)   -CB     Post Treatment Position  chair  (Pended)   -CB     In Chair  notified nsg;reclined;call light within reach;encouraged to call for assist;exit alarm on  (Pended)   -CB       User Key  (r) = Recorded By, (t) = Taken By, (c) = Cosigned By    Initials Name Provider Type    CB Len Campbell, PT Student PT Student        Outcome Measures     Row Name 09/20/21 1105          How much help from another person do you currently need...    Turning from your back to your side while in flat bed without using bedrails?  2  (Pended)   -CB     Moving from lying on back to sitting on the side of a flat bed without bedrails?  2  (Pended)   -CB     Moving to and from a bed to a chair (including a wheelchair)?  3  (Pended)   -CB     Standing up from a chair using your arms (e.g., wheelchair, bedside chair)?  3  (Pended)   -CB     Climbing 3-5 steps with a railing?  1  (Pended)   -CB     To walk in hospital room?  3  (Pended)   -CB     AM-PAC 6 Clicks Score (PT)  14  (Pended)   -CB     Row Name 09/20/21 1046          Modified [place] Scale    Modified [place] Scale  4 - Moderately severe disability.  Unable to walk without assistance, and unable to attend to own bodily needs without assistance.  -RB     Row Name 09/20/21 1046          Functional  Assessment    Outcome Measure Options  AM-PAC 6 Clicks Daily Activity (OT);Modified [place]  -RB       User Key  (r) = Recorded By, (t) = Taken By, (c) = Cosigned By    Initials Name Provider Type    RB Claudia Jorge, OT Occupational Therapist    Len Bill, PT Student PT Student                       Physical Therapy Education                 Title: PT OT SLP Therapies (Done)     Topic: Physical Therapy (Done)     Point: Mobility training (Done)     Learning Progress Summary           Patient Acceptance, E,TB,D, VU,NR by  at 9/20/2021 1106    Acceptance, E, VU,NR by  at 9/19/2021 1447                   Point: Home exercise program (Done)     Learning Progress Summary           Patient Acceptance, E,TB,D, VU,NR by  at 9/20/2021 1106    Acceptance, E, VU,NR by  at 9/19/2021 1447                   Point: Body mechanics (Done)     Learning Progress Summary           Patient Acceptance, E,TB,D, VU,NR by  at 9/20/2021 1106    Acceptance, E, VU,NR by  at 9/19/2021 1447                   Point: Precautions (Done)     Learning Progress Summary           Patient Acceptance, E,TB,D, VU,NR by  at 9/20/2021 1106    Acceptance, E, VU,NR by  at 9/19/2021 1447                               User Key     Initials Effective Dates Name Provider Type Discipline     06/16/21 -  Sadia Castano, PT Physical Therapist PT     08/13/21 -  Len Campbell, PT Student PT Student PT              PT Recommendation and Plan     Plan of Care Reviewed With: (P) patient  Progress: (P) improving  Outcome Summary: (P) Pt agreeable to be co-treated with PT and OT this am. Pt needed min/mod A x2 for bed mobility to sit EOB with patient demo increased lean to the R in sitting. Pt performed stand pivot transfer to [facility] with miin A x2 and able to support self in sitting for several minutes before standing again to ambulate. Pt ambulated 25 ft. with min A x2 with R sided weakness and lean towards the R with assistance needed for  walker management. PT to continue to address functional mobility deficits. Pt hopeful to return home with assist from her daughters but would likely benefit from continued rehab before returning home.     Time Calculation:   PT Charges     Row Name 09/20/21 1106             Time Calculation    Start Time  0954  (Pended)   -CB      Stop Time  1020  (Pended)   -CB      Time Calculation (min)  26 min  (Pended)   -CB      PT Received On  09/20/21  (Pended)   -CB      PT - Next Appointment  09/21/21  (Pended)   -CB        User Key  (r) = Recorded By, (t) = Taken By, (c) = Cosigned By    Initials Name Provider Type    Len Bill, PT Student PT Student        Therapy Charges for Today     Code Description Service Date Service Provider Modifiers Qty    26777827716 HC PT THER PROC EA 15 MIN 9/20/2021 Len Campbell, PT Student GP 2          PT G-Codes  Outcome Measure Options: AM-PAC 6 Clicks Daily Activity (OT), Modified [place]  AM-PAC 6 Clicks Score (PT): (P) 14  AM-PAC 6 Clicks Score (OT): 14  Modified [place] Scale: 4 - Moderately severe disability.  Unable to walk without assistance, and unable to attend to own bodily needs without assistance.    Len Campbell PT Student  9/20/2021

## 2021-09-20 NOTE — PLAN OF CARE
Goal Outcome Evaluation:         Pt. A&O X4, pt's Iathergy improved, pt. Took oral meds crushed in apple sauce without any issues. Bp managed with iv metoprolol. Patient's HR (less than 100) improved with iv metoprolol, pt. Speech has also improved, able to understand her now, tongue swelling has gone down. No seizure episode on this shift.

## 2021-09-20 NOTE — PLAN OF CARE
Goal Outcome Evaluation:  Plan of Care Reviewed With: patient             Problem: Adult Inpatient Plan of Care  Goal: Plan of Care Review  Flowsheets (Taken 9/20/2021 9281)  Plan of Care Reviewed With: patient  Outcome Summary: Re-eval of swallow completd. Pt much more alert, able to converse with SLP this date. MD started clear liquid diet d/t improved alertness/mental status. Cough x1 noted with thin liqiuds via straw when taking consecutive drinks. Otherwise, no s/s of aspiration observed across multiple trials of thin, pureed, or solids. Noted tongue thrust pattern utilized for A-P transit with puree and solids. However, mastication was adequate and oral clearance complete. She reports that she normally has no dysphagia at baseline and tolerates regular solids despite edentulous status.   Recommend regular diet/thin liquids.   NO STRAWS. Single sips only in fully upright position.   Meds to be given whole in puree.  SLP to follow for diet tolerance and need for instrumental exam if concerns arise

## 2021-09-20 NOTE — PROGRESS NOTES
Patient Identification:  NAME:  Amalia Elkins  Age:  56 y.o.   Sex:  female   :  1965   MRN:  [id number]       Chief complaint: Focal motor seizure activity, Wicho's paralysis    History of present illness: She is feeling much better today denies any further seizure activity and thinks her strength on the right side is returning to normal the EEG was grossly abnormal with epileptiform potential in the left hemisphere and a witnessed brief focal seizure during that EEG tracing she has not had any further seizures overnight.      Past medical history:  Past Medical History:   Diagnosis Date   • Diabetes mellitus (CMS/HCC)    • Hypertension    • Stroke (CMS/[facility])        Allergies:  Patient has no known allergies.    Home medications:  Medications Prior to Admission   Medication Sig Dispense Refill Last Dose   • amLODIPine (NORVASC) 5 MG tablet Take 5 mg by mouth Daily.   2021 at Unknown time   • aspirin 81 MG chewable tablet Chew 81 mg Daily.   2021 at Unknown time   • atorvastatin (LIPITOR) 80 MG tablet Take 80 mg by mouth Every Night.   9/15/2021   • DULoxetine (CYMBALTA) 60 MG capsule Take 60 mg by mouth Daily.   2021 at Unknown time   • levETIRAcetam (KEPPRA) 500 MG tablet Take 1,000 mg by mouth 2 (Two) Times a Day.   2021 at Unknown time   • lisinopril (PRINIVIL,ZESTRIL) 40 MG tablet Take 40 mg by mouth Daily.   2021 at Unknown time   • melatonin 3 MG tablet Take 10 mg by mouth At Night As Needed.      • metFORMIN (GLUCOPHAGE) 1000 MG tablet Take 1,000 mg by mouth 2 (Two) Times a Day With Meals.   2021 at Unknown time   • pioglitazone (ACTOS) 15 MG tablet Take 15 mg by mouth Daily.   2021 at Unknown time   • traZODone (DESYREL) 50 MG tablet Take 150 mg by mouth Every Night.   9/15/2021        Hospital medications:  amLODIPine, 5 mg, Oral, Q24H  aspirin, 81 mg, Oral, Daily  atorvastatin, 80 mg, Oral, Nightly  DULoxetine, 60 mg, Oral, Daily  insulin lispro, 0-7 Units,  Subcutaneous, 4x Daily With Meals & Nightly  lacosamide, 100 mg, Oral, Q12H  levETIRAcetam, 1,500 mg, Oral, Q12H  lisinopril, 40 mg, Oral, Q24H  metoprolol tartrate, 12.5 mg, Oral, Q12H  nystatin, , Topical, Q12H  sodium chloride, 10 mL, Intravenous, Q12H      sodium chloride, 75 mL/hr, Last Rate: 75 mL/hr (09/20/21 1029)      •  acetaminophen **OR** acetaminophen **OR** acetaminophen  •  dextrose  •  dextrose  •  glucagon (human recombinant)  •  LORazepam  •  melatonin  •  nitroglycerin  •  ondansetron  •  sodium chloride      Objective:  Vitals Ranges:   Temp:  [97.8 °F (36.6 °C)-98.8 °F (37.1 °C)] 98.1 °F (36.7 °C)  Heart Rate:  [] 106  Resp:  [14-20] 16  BP: (143-162)/() 162/98      Physical Exam:  Normal cranial nerves II through VII tongue is midline able to comprehend name and repeat right upper extremity 5- out of 5 right lower extremity 5 out of 5 reflexes trace throughout symmetrical left toe downgoing right toe mute    Results review:   I reviewed the patient's new clinical results.    Data review:  Lab Results (last 24 hours)     Procedure Component Value Units Date/Time    POC Glucose Once [[id number]]  (Abnormal) Collected: 09/20/21 1111    Specimen: Blood Updated: 09/20/21 1114     Glucose 141 mg/dL      Comment: Meter: [id number] : [id number] Lyssa DUGAN       COVID PRE-OP / PRE-PROCEDURE SCREENING ORDER (NO ISOLATION) - Swab, Nasopharynx [[id number]] Collected: 09/20/21 1047    Specimen: Swab from Nasopharynx Updated: 09/20/21 1058    Narrative:      The following orders were created for panel order COVID PRE-OP / PRE-PROCEDURE SCREENING ORDER (NO ISOLATION) - Swab, Nasopharynx.  Procedure                               Abnormality         Status                     ---------                               -----------         ------                     COVID-19,APTIMA PANTHER(...[[id number]]                      In process                   Please view results for these tests on the  individual orders.    COVID-19,APTIMA PANTHER(JAVY),[facility], NP/OP SWAB IN UTM/VTM/SALINE TRANSPORT MEDIA,24 HR TAT - Swab, Nasopharynx [[id number]] Collected: 09/20/21 1047    Specimen: Swab from Nasopharynx Updated: 09/20/21 1058    CBC (No Diff) [[id number]]  (Abnormal) Collected: 09/20/21 0627    Specimen: Blood Updated: 09/20/21 0817     WBC 7.56 10*3/mm3      RBC 4.30 10*6/mm3      Hemoglobin 12.0 g/dL      Hematocrit 38.5 %      MCV 89.5 fL      MCH 27.9 pg      MCHC 31.2 g/dL      RDW 13.3 %      RDW-SD 44.1 fl      MPV 10.4 fL      Platelets 220 10*3/mm3     Basic Metabolic Panel [[id number]]  (Abnormal) Collected: 09/20/21 0627    Specimen: Blood Updated: 09/20/21 0815     Glucose 92 mg/dL      BUN 9 mg/dL      Creatinine 0.69 mg/dL      Sodium 141 mmol/L      Potassium 3.5 mmol/L      Chloride 105 mmol/L      CO2 25.0 mmol/L      Calcium 8.3 mg/dL      eGFR  African Amer 107 mL/min/1.73      eGFR Non African Amer 88 mL/min/1.73      BUN/Creatinine Ratio 13.0     Anion Gap 11.0 mmol/L     Narrative:      GFR Normal >60  Chronic Kidney Disease <60  Kidney Failure <15      POC Glucose Once [[id number]]  (Normal) Collected: 09/20/21 0723    Specimen: Blood Updated: 09/20/21 0732     Glucose 113 mg/dL      Comment: Meter: [id number] : [id number] Lyssa DUGAN       POC Glucose Once [[id number]]  (Normal) Collected: 09/19/21 2046    Specimen: Blood Updated: 09/19/21 2047     Glucose 104 mg/dL      Comment: Meter: [id number] : [id number] Dejah DUGAN              Imaging:  Imaging Results (Last 24 Hours)     ** No results found for the last 24 hours. **      PPE worn at all times washed before washed afterwards disposed of everything properly was now within 6 feet of her for more than few minutes during my exam no aerosols used at any point    Assessment and Plan:     No further seizure activity.  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  The EEG does show definite  epileptiform potential in the left hemisphere that was associated with a brief seizure at that time she has not had any further seizure activity and today her speech is good and her right-sided strength is much improved.  Given the hard focal nature of this seizure activity which is related to use the previous stroke, as well as the appearance of the EEG, I will increase the Keppra up to 2000 mg p.o. twice daily along with Vimpat 100 mg p.o. twice daily  There is some chance of increased lethargy with this increasing Keppra but I think she will tolerate it nicely and we definitely want to avoid any further seizure activity.  Neurology will sign off and follow-up as needed reconsult thanks      Bruce Mayfield MD  09/20/21  14:42 EDT

## 2021-09-20 NOTE — PLAN OF CARE
Goal Outcome Evaluation:              Outcome Summary: Alert and oriented today. Periods of unconsolable crying, calmed quickly. No seizure activity noted. Tolerated diet. Up to chair with therapy and back to bed with nursing. Spoke with son, Gerson, and updated on phone.

## 2021-09-20 NOTE — PLAN OF CARE
Pt admitted to [facility] 2/2 to a breakthrough seizure. Pmhx includes seziures, disabetes, and stroke with residual R sided weakness. Pt reports she requires assist for ADL's at home from her three daughters. Pt completed bed mobility with Min A x2. Min A x2 to stand and pivot to BSC. Pt attempted a bowel movement with no luck. Min A x2 to stand and take steps towards the doorway. X1 LOB with use of walker, general unsteadiness noticed. Pt emotional during session and HR/BP elevated (/98, HR >110). Pt remained in bedside chair post session. Pt is unsafe to d/c home at this time. Pt reports she has daughters to assist her, however, she reports she is not at her baseline. She may benefit from a short rehab stay with PT/SLP/OT working with her to increase her mobility, ADL indep. And strength.    Patient was not wearing a face mask during this therapy encounter. Therapist used appropriate personal protective equipment including mask, goggles and gloves. Mask used was standard procedure mask. Appropriate PPE was worn during the entire therapy session. Hand hygiene was completed before and after therapy session. Patient is not in enhanced droplet precautions.

## 2021-09-20 NOTE — PLAN OF CARE
Goal Outcome Evaluation:  Plan of Care Reviewed With: (P) patient        Progress: (P) improving  Outcome Summary: (P) Pt agreeable to be co-treated with PT and OT this am. Pt needed min/mod A x2 for bed mobility to sit EOB with patient demo increased lean to the R in sitting. Pt performed stand pivot transfer to [facility] with miin A x2 and able to support self in sitting for several minutes before standing again to ambulate. Pt ambulated 25 ft. with min A x2 with R sided weakness and lean towards the R with assistance needed for walker management. PT to continue to address functional mobility deficits. Pt hopeful to return home with assist from her daughters but would likely benefit from continued rehab before returning home.

## 2021-09-20 NOTE — THERAPY RE-EVALUATION
Acute Care - Speech Language Pathology   Swallow Re-Evaluation [facility]     Patient Name: Amalia Elkins  : 1965  MRN: [id number]  Today's Date: 2021               Admit Date: 2021    Visit Dx:   No diagnosis found.  Patient Active Problem List   Diagnosis   • Type 2 diabetes mellitus with hypoglycemia without coma (CMS/HCC)   • Epilepsy (CMS/HCC)   • History of ischemic stroke   • HTN (hypertension)   • Breakthrough seizure (CMS/HCC)   • Seizure (CMS/HCC)     Past Medical History:   Diagnosis Date   • Diabetes mellitus (CMS/HCC)    • Hypertension    • Stroke (CMS/HCC)      History reviewed. No pertinent surgical history.    SLP Recommendation and Plan  SLP Swallowing Diagnosis: oral dysphagia  SLP Diet Recommendation: regular textures, thin liquids  Recommended Precautions and Strategies: no straw, upright posture during/after eating, small bites of food and sips of liquid  SLP Rec. for Method of Medication Administration: meds whole, with pudding or applesauce     Monitor for Signs of Aspiration: yes, notify SLP if any concerns     Swallow Criteria for Skilled Therapeutic Interventions Met: demonstrates skilled criteria  Anticipated Discharge Disposition (SLP): unknown  Rehab Potential/Prognosis, Swallowing: good, to achieve stated therapy goals  Therapy Frequency (Swallow): PRN  Predicted Duration Therapy Intervention (Days): until discharge                         Plan of Care Reviewed With: patient  Outcome Summary: Re-eval of swallow completd. Pt much more alert, able to converse with SLP this date. MD started clear liquid diet d/t improved alertness/mental status. Cough x1 noted with thin liqiuds via straw when taking consecutive drinks. Otherwise, no s/s of aspiration observed across multiple trials of thin, pureed, or solids. Noted tongue thrust pattern utilized for A-P transit with puree and solids. However, mastication was adequate and oral clearance complete. She reports that she  normally has no dysphagia at baseline and tolerates regular solids despite edentulous status. Recommend regular diet/thin liquids. NO STRAWS. Single sips only in fully upright position. Meds to be given whole in puree.         SWALLOW EVALUATION (last 72 hours)      SLP Adult Swallow Evaluation     Row Name 09/20/21 0900 09/18/21 1200                Rehab Evaluation    Document Type  re-evaluation  -CP  evaluation  -ML       Subjective Information  no complaints  -CP  no complaints  -ML       Patient Observations  alert;cooperative  -CP  lethargic  -ML       Patient/Family/Caregiver Comments/Observations  --  No family present. Pt very lethargic and requiring constant cues to alert enough for p.o. trials.   -ML       Care Plan Review  evaluation/treatment results reviewed;care plan/treatment goals reviewed;patient/other agree to care plan  -CP  evaluation/treatment results reviewed  -ML       Patient Effort  good  -CP  adequate  -ML       Symptoms Noted During/After Treatment  none  -CP  none  -ML          General Information    Patient Profile Reviewed  yes  -CP  yes  -ML       Pertinent History Of Current Problem  --  s/p multiple seizures. 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   -ML       Current Method of Nutrition  clear liquids  -CP  NPO  -ML       Precautions/Limitations, Hearing  WFL  -CP  --       Prior Level of Function-Communication  unknown  -CP  unknown  -ML       Prior Level of Function-Swallowing  no diet consistency restrictions  -CP  unknown  -ML       Plans/Goals Discussed with  patient;agreed upon  -CP  --       Barriers to Rehab  none identified  -CP  medically complex  -ML       Patient's Goals for Discharge  patient did not state  -CP  --          Pain Scale: Numbers Pre/Post-Treatment    Pretreatment Pain Rating  0/10 - no pain  -CP  --       Posttreatment Pain Rating  0/10 - no pain  -CP  --          Oral Motor Structure and  Function    Dentition Assessment  edentulous  -CP  edentulous  -ML       Secretion Management  WNL/WFL  -CP  WNL/WFL  -ML       Mucosal Quality  moist, healthy  -CP  moist, healthy  -ML          Oral Musculature and Cranial Nerve Assessment    Oral Labial or Buccal Impairment, Detail, Cranial Nerve VII (Facial):  right labial droop  -CP  --       Lingual Impairment, Detail. Cranial Nerves IX, XII (Glossopharyngeal and Hypoglossal)  reduced lingual ROM;reduced strength  -CP  --       Oral Motor, Comment  --  Pt with noted foward resting tongue position and moderate-severe dysarthria suspect to be impacted by lethargy as well. Pt followed minimal oral motor movements which all appeared with impaired ROM. Pt appeared to exhibit R facial droop at rest.   -ML          General Eating/Swallowing Observations    Respiratory Support Currently in Use  room air  -CP  room air  -ML       Eating/Swallowing Skills  self-fed  -CP  fed by SLP  -ML       Positioning During Eating  upright in bed  -CP  upright in bed  -ML       Utensils Used  spoon;cup;straw  -CP  spoon;cup;straw  -ML       Consistencies Trialed  ice chips;thin liquids;pureed;regular textures  -CP  pureed;ice chips;thin liquids;nectar/syrup-thick liquids  -ML       Pre SpO2 (%)  98  -CP  --       Post SpO2 (%)  98  -CP  --          Respiratory    Respiratory Status  WFL  -CP  --          Clinical Swallow Eval    Clinical Swallow Evaluation Summary  Re-eval of swallow completd. Pt much more alert, able to converse with SLP this date. MD started clear liquid diet d/t improved alertness/mental status. Cough x1 noted with thin liqiuds via straw when taking consecutive drinks. Otherwise, no s/s of aspiration observed across multiple trials of thin, pureed, or solids. Noted tongue thrust pattern utilized for A-P transit with puree and solids. However, mastication was adequate and oral clearance complete. She reports that she normally has no dysphagia at baseline and  tolerates regular solids despite edentulous status. Recommend regular diet/thin liquids. NO STRAWS. Single sips only in fully upright position. Meds to be given whole in puree.   -CP  Clinical swallowing evaluation completed. Pt extremely lethargic and required constant cues to remain alert for each trial. Pt presents with suspected mild-moderate oropharyngeal dysphagia characterized by slow oral transit with puree with repetitive lingual A-P movements and suspected uncoordinated oral transit and swallow initiation/airway closure indicated by delayed cough after thin via cup x1 and straw x1. Pt exhibited no overt s/s of penetration/aspiration during any trials of nectar thick liquid via straw or puree. Pt appears at high risk of aspiration at this time given lethargy and s/s noted. Recommend to continue NPO at this time, however, if pt is alert, pt can have meds crushed in puree and drinks of nectar thick liquids/bites of puree. Fully upright posture and 1:1 assist. Will plan to reassess on 9/20.   -ML          Clinical Impression    SLP Swallowing Diagnosis  oral dysphagia  -CP  mild-moderate;oral dysphagia;suspected pharyngeal dysphagia  -ML       Functional Impact  risk of aspiration/pneumonia  -CP  risk of aspiration/pneumonia  -ML       Rehab Potential/Prognosis, Swallowing  good, to achieve stated therapy goals  -CP  good, to achieve stated therapy goals  -ML       Swallow Criteria for Skilled Therapeutic Interventions Met  demonstrates skilled criteria  -CP  demonstrates skilled criteria  -ML          Recommendations    Therapy Frequency (Swallow)  PRN  -CP  PRN  -ML       Predicted Duration Therapy Intervention (Days)  until discharge  -CP  until discharge  -ML       SLP Diet Recommendation  regular textures;thin liquids  -CP  NPO see above  -ML       Recommended Diagnostics  --  reassess via clinical swallow evaluation  -ML       Recommended Precautions and Strategies  no straw;upright posture during/after  eating;small bites of food and sips of liquid  -CP  --       Oral Care Recommendations  Oral Care BID/PRN  -CP  Oral Care BID/PRN  -ML       SLP Rec. for Method of Medication Administration  meds whole;with pudding or applesauce  -CP  meds crushed;with pudding or applesauce  -ML       Monitor for Signs of Aspiration  yes;notify SLP if any concerns  -CP  notify SLP if any concerns;yes  -ML       Anticipated Discharge Disposition (SLP)  unknown  -CP  unknown  -ML          Swallow Goals (SLP)    Oral Nutrition/Hydration Goal Selection (SLP)  oral nutrition/hydration, SLP goal 1  -CP  oral nutrition/hydration, SLP goal 1  -ML          Oral Nutrition/Hydration Goal 1 (SLP)    Oral Nutrition/Hydration Goal 1, SLP  --  Pt will safely swallow least restrictive diet without overt s/s of penetration/aspiration.   -ML       Time Frame (Oral Nutrition/Hydration Goal 1, SLP)  --  by discharge  -ML         User Key  (r) = Recorded By, (t) = Taken By, (c) = Cosigned By    Initials Name Effective Dates    CP Ariana Lopes MS CCC-SLP 06/16/21 -     Adrianna Nguyễn MS CCC-SLP 06/16/21 -           EDUCATION  The patient has been educated in the following areas:   Dysphagia (Swallowing Impairment).       SLP GOALS     Row Name 09/18/21 1200             Oral Nutrition/Hydration Goal 1 (SLP)    Oral Nutrition/Hydration Goal 1, SLP  Pt will safely swallow least restrictive diet without overt s/s of penetration/aspiration.   -ML      Time Frame (Oral Nutrition/Hydration Goal 1, SLP)  by discharge  -ML        User Key  (r) = Recorded By, (t) = Taken By, (c) = Cosigned By    Initials Name Provider Type    ML Adrianna Leija MS CCC-SLP Speech and Language Pathologist           SLP Outcome Measures (last 72 hours)      SLP Outcome Measures     Row Name 09/20/21 0900 09/18/21 1400          SLP Outcome Measures    Outcome Measure Used?  Adult NOMS  -CP  Adult NOMS  -ML        Adult FCM Scores    FCM Chosen  Swallowing  -CP   Swallowing  -ML     Swallowing FCM Score  6  -CP  2  -ML       User Key  (r) = Recorded By, (t) = Taken By, (c) = Cosigned By    Initials Name Effective Dates    Ariana Penny MS CCC-SLP 06/16/21 -     ML Adrianna Leija, MS CCC-SLP 06/16/21 -            Time Calculation:   Time Calculation- SLP     Row Name 09/20/21 0959             Time Calculation- SLP    SLP Start Time  0815  -CP      SLP Received On  09/20/21  -CP         Untimed Charges    42316-KD Treatment Swallow Minutes  60  -CP         Total Minutes    Untimed Charges Total Minutes  60  -CP       Total Minutes  60  -CP        User Key  (r) = Recorded By, (t) = Taken By, (c) = Cosigned By    Initials Name Provider Type    Ariana Penny MS CCC-SLP Speech and Language Pathologist          Therapy Charges for Today     Code Description Service Date Service Provider Modifiers Qty    [id number] HC ST TREATMENT SWALLOW 4 9/20/2021 Ariana Lopes MS CCC-SLP GN 1        Patient was not wearing a face mask during this therapy encounter. Therapist used appropriate personal protective equipment including mask, eye protection and gloves.  Mask used was standard procedure mask. Appropriate PPE was worn during the entire therapy session. Hand hygiene was completed before and after therapy session. Patient is not    in enhanced droplet precautions.            MS NICHOLAS Allen  9/20/2021

## 2021-09-20 NOTE — CASE MANAGEMENT/SOCIAL WORK
Discharge Planning Assessment  [facility]     Patient Name: Amalia Elkins  MRN: [id number]  Today's Date: 9/20/2021    Admit Date: 9/17/2021    Discharge Needs Assessment     Row Name 09/20/21 1349       Living Environment    Lives With  child(jazzy), adult    Name(s) of Who Lives With Patient  lives with 3 daughters    Current Living Arrangements  home/apartment/condo    Primary Care Provided by  self;child(jazzy)    Provides Primary Care For  no one    Family Caregiver if Needed  child(jazzy), adult    Quality of Family Relationships  involved;helpful;supportive    Able to Return to Prior Arrangements  yes       Resource/Environmental Concerns    Resource/Environmental Concerns  none       Transition Planning    Patient/Family Anticipates Transition to  home with family;home with help/services    Patient/Family Anticipated Services at Transition  home health care    Transportation Anticipated  family or friend will provide       Discharge Needs Assessment    Current Outpatient/Agency/Support Group  homecare agency    Concerns to be Addressed  discharge planning    Equipment Needed After Discharge  walker, rolling        Discharge Plan     Row Name 09/20/21 1350       Plan    Plan  Home w/ 24/7 family assist and HH PT ([facility] referral pending)    Provided Post Acute Provider List?  Refused    Refused Provider List Comment  Denies SNF referrals at this time    Provided Post Acute Provider Quality & Resource List?  Refused    Refused Quality and Resource List Comment  Denies SNF referrals at this time    Patient/Family in Agreement with Plan  yes    Plan Comments  CCP spoke to patient’s daughter over the phone; explained role of CCP, verified facesheet, and discussed d/c planning needs. Plan is home w/ 24/7 assist form family and HH PT. Family is able to transport. Patient uses the [facility] at [address] as a preferred pharmacy. Patient’s family requests walker at d/c and has no preference for [employer] [facility]. CCP left  [facility] for Ekaterina/Dina’s requesting a walker. Patient lives with her 3 daughters in a 1 level home. She has assist 24/7 from them. Patient’s daughter states she is current with [facility] but they do not offer PT and she requests home PT. CCP made HH referral to [facility]. Per Gina/Jade, they are not current with them and they cannot accept at this time. Patient’s family agreeable to [facility] referral. Per PT note, patient would benefit from a rehab stay. Patient’s daughter denies SNF referrals at this time. CCP to follow to make sure walker orders go through. CCP to follow HH referral status. CCP to follow for any other recommendations arise pending treatment. TY Ball        Continued Care and Services - Admitted Since 9/17/2021     Home Medical Care     Service Provider Request Status Selected Services Address Phone Fax Patient Preferred    [facility]  Pending - Request Sent N/A [address], [address], [facility] [id number] [phone] --              Expected Discharge Date and Time     Expected Discharge Date Expected Discharge Time    Sep 23, 2021         Demographic Summary     Row Name 09/20/21 1345       General Information    Admission Type  inpatient    Arrived From  home    Referral Source  admission list    Reason for Consult  discharge planning    Preferred Language  English     Used During This Interaction  no       Contact Information    Permission Granted to Share Info With  family/designee        Functional Status     Row Name 09/20/21 1349       Functional Status    Usual Activity Tolerance  moderate    Current Activity Tolerance  moderate       Functional Status, IADL    Medications  assistive equipment and person    Meal Preparation  assistive equipment and person    Housekeeping  assistive equipment and person    Laundry  assistive equipment and person    Shopping  assistive equipment and person       Mental Status    General  Appearance WDL  WDL       Employment/    Employment Status  disabled        Psychosocial    No documentation.       Abuse/Neglect    No documentation.       Legal    No documentation.       Substance Abuse    No documentation.       Patient Forms    No documentation.           TY HODGES

## 2021-09-20 NOTE — DISCHARGE PLACEMENT REQUEST
"Amalia Elkins (56 y.o. Female)     Date of Birth Social Security Number Address Home Phone MRN    1965  [id number]  [facility] [address] [phone] [id number]    Muslim Marital Status          None        Admission Date Admission Type Admitting Provider Attending Provider Department, Room/Bed    9/17/21 Urgent Sam Fajardo MD [place], Robert Russell, MD [address], P576/1    Discharge Date Discharge Disposition Discharge Destination                       Attending Provider: Sam Fajardo MD    Allergies: No Known Allergies    Isolation: None   Infection: COVID Screen (preop/placement) (09/20/21)   Code Status: CPR    Ht: 157.5 cm (62\")   Wt: 107 kg (236 lb 8.9 oz)    Admission Cmt: None   Principal Problem: Breakthrough seizure (CMS/[facility]) [G40.919]                 Active Insurance as of 9/17/2021     Primary Coverage     Payor Plan Insurance Group Employer/Plan Group    MEDICARE MEDICARE A & B      Payor Plan Address Payor Plan Phone Number Payor Plan Fax Number Effective Dates    [address] [phone]  3/1/2019 - None Entered    [facility] [id number]       Subscriber Name Subscriber Birth Date Member ID       AMALIA ELKINS 1965 [id number]           Secondary Coverage     Payor Plan Insurance Group Employer/Plan Group    [id number]      Payor Plan Address Payor Plan Phone Number Payor Plan Fax Number Effective Dates    [address]   9/17/2021 - None Entered    [facility] [place] [address]       Subscriber Name Subscriber Birth Date Member ID       AMALIA ELKINS 1965 [id number]                 Emergency Contacts      (Rel.) Home Phone Work Phone Mobile Phone    Gerosn Singh (Son) [phone] -- --    SINCERE PAGAN (Daughter) [phone] -- --    Junior Singh (Son) -- -- --    Shlomo Lopez (Brother) -- -- [phone]              "

## 2021-09-21 VITALS
SYSTOLIC BLOOD PRESSURE: 156 MMHG | HEART RATE: 106 BPM | DIASTOLIC BLOOD PRESSURE: 94 MMHG | TEMPERATURE: 97 F | BODY MASS INDEX: 43.53 KG/M2 | WEIGHT: 236.55 LBS | RESPIRATION RATE: 18 BRPM | OXYGEN SATURATION: 96 % | HEIGHT: 62 IN

## 2021-09-21 PROBLEM — R56.9 SEIZURE: Status: RESOLVED | Noted: 2021-09-17 | Resolved: 2021-09-21

## 2021-09-21 LAB
GLUCOSE BLDC GLUCOMTR-MCNC: 118 MG/DL (ref 70–130)
GLUCOSE BLDC GLUCOMTR-MCNC: 127 MG/DL (ref 70–130)

## 2021-09-21 PROCEDURE — 82962 GLUCOSE BLOOD TEST: CPT

## 2021-09-21 PROCEDURE — 97530 THERAPEUTIC ACTIVITIES: CPT

## 2021-09-21 RX ORDER — NYSTATIN 100000 [USP'U]/G
POWDER TOPICAL EVERY 12 HOURS SCHEDULED
Qty: 30 G | Refills: 0 | Status: SHIPPED | OUTPATIENT
Start: 2021-09-21

## 2021-09-21 RX ORDER — LEVETIRACETAM 1000 MG/1
2000 TABLET ORAL EVERY 12 HOURS SCHEDULED
Qty: 120 TABLET | Refills: 0 | Status: SHIPPED | OUTPATIENT
Start: 2021-09-21

## 2021-09-21 RX ORDER — LACOSAMIDE 100 MG/1
100 TABLET ORAL EVERY 12 HOURS SCHEDULED
Qty: 60 TABLET | Refills: 0 | Status: SHIPPED | OUTPATIENT
Start: 2021-09-21

## 2021-09-21 RX ADMIN — AMLODIPINE BESYLATE 5 MG: 5 TABLET ORAL at 08:07

## 2021-09-21 RX ADMIN — LISINOPRIL 40 MG: 40 TABLET ORAL at 08:07

## 2021-09-21 RX ADMIN — ACETAMINOPHEN 650 MG: 325 TABLET, FILM COATED ORAL at 07:14

## 2021-09-21 RX ADMIN — SODIUM CHLORIDE, PRESERVATIVE FREE 10 ML: 5 INJECTION INTRAVENOUS at 08:06

## 2021-09-21 RX ADMIN — DULOXETINE HYDROCHLORIDE 60 MG: 60 CAPSULE, DELAYED RELEASE ORAL at 08:07

## 2021-09-21 RX ADMIN — NYSTATIN: 100000 POWDER TOPICAL at 08:08

## 2021-09-21 RX ADMIN — METOPROLOL TARTRATE 12.5 MG: 25 TABLET, FILM COATED ORAL at 08:07

## 2021-09-21 RX ADMIN — ASPIRIN 81 MG: 81 TABLET, CHEWABLE ORAL at 08:07

## 2021-09-21 RX ADMIN — LACOSAMIDE 100 MG: 100 TABLET, FILM COATED ORAL at 08:06

## 2021-09-21 RX ADMIN — LEVETIRACETAM 2000 MG: 500 TABLET, FILM COATED ORAL at 08:07

## 2021-09-21 NOTE — PLAN OF CARE
Goal Outcome Evaluation:  Plan of Care Reviewed With: patient        Progress: improving  Outcome Summary: Pt making improvements today w/ amb of 80' req CGA x 2 and use of fww. Slow, unsteady gait noted w/ assist for steering fww. Pt req cga/min A x 2 w/ use of fww for STS from BSC. Pt performed BLE TE in chair w/ freq redirect to task.    Patient was intermittently wearing a face mask during this therapy encounter. Therapist used appropriate personal protective equipment including eye protection, mask, and gloves.  Mask used was standard procedure mask. Appropriate PPE was worn during the entire therapy session. Hand hygiene was completed before and after therapy session. Patient is not in enhanced droplet precautions.  PT tech: Donya Mcwilliams.

## 2021-09-21 NOTE — THERAPY TREATMENT NOTE
Patient Name: Amalia Elkins  : 1965    MRN: [id number]                              Today's Date: 2021       Admit Date: 2021    Visit Dx: No diagnosis found.  Patient Active Problem List   Diagnosis   • Type 2 diabetes mellitus with hypoglycemia without coma (CMS/HCC)   • Epilepsy (CMS/HCC)   • History of ischemic stroke   • HTN (hypertension)   • Breakthrough seizure (CMS/HCC)     Past Medical History:   Diagnosis Date   • Diabetes mellitus (CMS/HCC)    • Hypertension    • Stroke (CMS/HCC)      History reviewed. No pertinent surgical history.  General Information     Row Name 21 1047          Physical Therapy Time and Intention    Document Type  therapy note (daily note)  -PH     Mode of Treatment  physical therapy  -PH     Row Name 21 1047          General Information    Existing Precautions/Restrictions  fall  -PH     Row Name 21 1047          Safety Issues, Functional Mobility    Impairments Affecting Function (Mobility)  balance;endurance/activity tolerance;strength;motor planning;coordination  -PH     Comment, Safety Issues/Impairments (Mobility)  gt belt and non skid socks for safety  -PH       User Key  (r) = Recorded By, (t) = Taken By, (c) = Cosigned By    Initials Name Provider Type    PH Rubia Quigley, PTA Physical Therapy Assistant        Mobility     Row Name 21 1048          Bed Mobility    Scooting/Bridging Kerrick (Bed Mobility)  unable to assess  -PH     Supine-Sit Kerrick (Bed Mobility)  unable to assess  -PH     Sit-Supine Ker[place] (Bed Mobility)  unable to assess  -PH     Comment (Bed Mobility)  Pt w/ RN just cleaned up and sitting on  BSC  -PH     Row Name 21 1048          Sit-Stand Transfer    Sit-Stand Kerrick (Transfers)  contact guard;2 person assist;verbal cues;minimum assist (75% patient effort)  -PH     Assistive Device (Sit-Stand Transfers)  walker, front-wheeled  -PH     Row Name 21 1044           Gait/Stairs (Locomotion)    [place] Level (Gait)  contact guard;verbal cues  -PH     Assistive Device (Gait)  walker, front-wheeled  -PH     Distance in Feet (Gait)  80'  -PH     Deviations/Abnormal Patterns (Gait)  base of support, wide;ha decreased;gait speed decreased;stride length decreased;weight shifting decreased;festinating/shuffling;right sided deviations  -PH     Bilateral Gait Deviations  forward flexed posture  -PH     Comment (Gait/Stairs)  mild R lean; assist for fww mgmt  -PH       User Key  (r) = Recorded By, (t) = Taken By, (c) = Cosigned By    Initials Name Provider Type     Rubia Quigley PTA Physical Therapy Assistant        Obj/Interventions     Row Name 09/21/21 1050          Motor Skills    Therapeutic Exercise  other (see comments) BAP w/ assist for R foot, LAQ, seated march, hip abd, SLR w/ assist; x 10 reps all; pt very distracted by brother's unanswered call; many tc/vc to remain on task and to incr ROM  -PH     Row Name 09/21/21 1050          Balance    Balance Assessment  standing static balance  -PH     Static Standing Balance  WFL;supported;standing  -PH       User Key  (r) = Recorded By, (t) = Taken By, (c) = Cosigned By    Initials Name Provider Type     Rubia Quigley PTA Physical Therapy Assistant        Goals/Plan    No documentation.       Clinical Impression     Row Name 09/21/21 1052          Pain    Additional Documentation  Pain Scale: Numbers Pre/Post-Treatment (Group)  -PH     Row Name 09/21/21 1052          Pain Scale: Numbers Pre/Post-Treatment    Pretreatment Pain Rating  0/10 - no pain  -PH     Posttreatment Pain Rating  0/10 - no pain  -PH     Row Name 09/21/21 1052          Plan of Care Review    Plan of Care Reviewed With  patient  -PH     Progress  improving  -PH     Outcome Summary  Pt making improvements today w/ amb of 80' req CGA x 2 and use of fww. Slow, unsteady gait noted w/ assist for steering fww. Pt req cga/min A x 2 w/ use  of fww for STS from [facility]. Pt performed BLE TE in chair w/ freq redirect to task.  -PH     Row Name 09/21/21 1052          Positioning and Restraints    Pre-Treatment Position  bedside commode  -PH     Post Treatment Position  chair  -PH     In Chair  reclined;call light within reach;encouraged to call for assist;exit alarm on  -PH       User Key  (r) = Recorded By, (t) = Taken By, (c) = Cosigned By    Initials Name Provider Type     Rubia Quigley PTA Physical Therapy Assistant        Outcome Measures     Row Name 09/21/21 1054          How much help from another person do you currently need...    Turning from your back to your side while in flat bed without using bedrails?  3  -PH     Moving from lying on back to sitting on the side of a flat bed without bedrails?  3  -PH     Moving to and from a bed to a chair (including a wheelchair)?  3  -PH     Standing up from a chair using your arms (e.g., wheelchair, bedside chair)?  3  -PH     Climbing 3-5 steps with a railing?  2  -PH     To walk in hospital room?  3  -PH     AM-PAC 6 Clicks Score (PT)  17  -PH     Row Name 09/21/21 1054          Functional Assessment    Outcome Measure Options  AM-PAC 6 Clicks Basic Mobility (PT)  -PH       User Key  (r) = Recorded By, (t) = Taken By, (c) = Cosigned By    Initials Name Provider Type     Rubia Quigley PTA Physical Therapy Assistant                       Physical Therapy Education                 Title: PT OT SLP Therapies (Resolved)     Topic: Physical Therapy (Resolved)     Point: Mobility training (Resolved)     Learning Progress Summary           Patient Acceptance, E,D, VU,NR by  at 9/21/2021 1054    Acceptance, E,TB,D, VU,NR by CB at 9/20/2021 1106    Acceptance, E, VU,NR by  at 9/19/2021 1447                   Point: Home exercise program (Resolved)     Learning Progress Summary           Patient Acceptance, E,D, VU,NR by  at 9/21/2021 1054    Acceptance, E,TB,D, VU,NR by CB at 9/20/2021  1106    Acceptance, E, VU,NR by  at 9/19/2021 1447                   Point: Body mechanics (Resolved)     Learning Progress Summary           Patient Acceptance, E,D, VU,NR by  at 9/21/2021 1054    Acceptance, E,TB,D, VU,NR by CB at 9/20/2021 1106    Acceptance, E, VU,NR by  at 9/19/2021 1447                   Point: Precautions (Resolved)     Learning Progress Summary           Patient Acceptance, E,D, VU,NR by  at 9/21/2021 1054    Acceptance, E,TB,D, VU,NR by CB at 9/20/2021 1106    Acceptance, E, VU,NR by  at 9/19/2021 1447                               User Key     Initials Effective Dates Name Provider Type Discipline     06/16/21 -  Sadia Castano, PT Physical Therapist PT     06/16/21 -  Rubia Quigley PTA Physical Therapy Assistant PT     08/13/21 -  Len Campbell, MONICA Student PT Student PT              PT Recommendation and Plan     Plan of Care Reviewed With: patient  Progress: improving  Outcome Summary: Pt making improvements today w/ amb of 80' req CGA x 2 and use of fww. Slow, unsteady gait noted w/ assist for steering fww. Pt req cga/min A x 2 w/ use of fww for STS from BSC. Pt performed BLE TE in chair w/ freq redirect to task.     Time Calculation:   PT Charges     Row Name 09/21/21 1059             Time Calculation    Start Time  0925  -PH      Stop Time  0941  -PH      Time Calculation (min)  16 min  -PH      PT Received On  09/21/21  -PH      PT - Next Appointment  09/22/21  -PH         Timed Charges    17624 - PT Therapeutic Exercise Minutes  6  -PH      73342 - PT Therapeutic Activity Minutes  10  -PH         Total Minutes    Timed Charges Total Minutes  16  -PH       Total Minutes  16  -PH        User Key  (r) = Recorded By, (t) = Taken By, (c) = Cosigned By    Initials Name Provider Type     Rubia Quigley PTA Physical Therapy Assistant        Therapy Charges for Today     Code Description Service Date Service Provider Modifiers Qty    74710759478  PT  THERAPEUTIC ACT EA 15 MIN 9/21/2021 Soraida Rubia, PTA GP 1    87672173616 HC PT THER SUPP EA 15 MIN 9/21/2021 Rubia Quigley PTA GP 1          PT G-Codes  Outcome Measure Options: AM-PAC 6 Clicks Basic Mobility (PT)  AM-PAC 6 Clicks Score (PT): 17  AM-PAC 6 Clicks Score (OT): 14  Modified Trinity Scale: 4 - Moderately severe disability.  Unable to walk without assistance, and unable to attend to own bodily needs without assistance.    Rubia Quigley PTA  9/21/2021

## 2021-09-21 NOTE — PROGRESS NOTES
Case Management Discharge Note      Final Note: Home with family and [facility] with walker via [facility]. Pt's family to transport. Bre Velarde LCSW    Provided Post Acute Provider List?: Refused  Refused Provider List Comment: Denies SNF referrals at this time  Provided Post Acute Provider Quality & Resource List?: Refused  Refused Quality and Resource List Comment: Denies SNF referrals at this time    Selected Continued Care - Admitted Since 9/17/2021     Destination    No services have been selected for the patient.              Durable Medical Equipment    No services have been selected for the patient.              Dialysis/Infusion    No services have been selected for the patient.              Home Medical Care Coordination complete.    Service Provider Selected Services Address Phone Fax Patient Preferred    [facility] [address] [address] [phone] --          Therapy    No services have been selected for the patient.              Community Resources    No services have been selected for the patient.              Community & DME    No services have been selected for the patient.                  Transportation Services  Private: Car    Final Discharge Disposition Code: 06 - home with home health care

## 2021-09-21 NOTE — DISCHARGE SUMMARY
Patient Name: Amalia Elkins  : 1965  MRN: [id number]    Date of Admission: 2021  Date of Discharge:  2021  Primary Care Physician: Terrence Damon      Chief Complaint:   No chief complaint on file.      Discharge Diagnoses     Active Hospital Problems    Diagnosis  POA   • **Breakthrough seizure (CMS/HCC) [G40.919]  Yes   • Type 2 diabetes mellitus with hypoglycemia without coma (CMS/HCC) [E11.649]  Yes   • Epilepsy (CMS/HCC) [G40.909]  Yes   • History of ischemic stroke [Z86.73]  Not Applicable   • HTN (hypertension) [I10]  Yes      Resolved Hospital Problems    Diagnosis Date Resolved POA   • Seizure (CMS/HCC) [R56.9] 2021 Yes        Hospital Course     Ms. Elkins is a 56 y.o. female with a history of epilepsy and prior stroke w/right sided weakness who presented to [facility] initially complaining of breakthrough seizures. She was initially seen in ED in [place], [place] then transferred to [facility].  Please see the admitting history and physical for further details.  She was found to have continued seizure activity w/post ictal state and was admitted to the hospital for further evaluation and treatment. CTH at outlying facility was negative. Neurology was consulted. Ativan was added as needed for additional seizure management. EEG was performed that was abnormal with epileptiform potential lt hemisphere. Medications have been adjusted with Keppra increased to high dose and addition of Vimpat. OT/PT have evaluated. BP has been elevated but her oral meds were held initially due to active seizures; amlodipine and lisinopril have been restarted, she has also been started on metoprolol. Medically she is stable for discharge home with family and home health services. She will need PCP f/u within the next week and Neurology per their recommendations.  Day of Discharge     Subjective:  No complaints. Wants to go home. Denies HA, chest pain, shortness of breath. Appetite wnl. Urinating  without difficulty    Physical Exam:  Temp:  [97.8 °F (36.6 °C)-99.4 °F (37.4 °C)] 99.4 °F (37.4 °C)  Heart Rate:  [] 96  Resp:  [16-18] 18  BP: (145-186)/() 186/127  Body mass index is 43.27 kg/m².  Physical Exam  Vitals and nursing note reviewed.   Constitutional:       General: She is not in acute distress.     Appearance: She is obese.   HENT:      Head: Normocephalic.      Mouth/Throat:      Mouth: Mucous membranes are moist.   Eyes:      Conjunctiva/sclera: Conjunctivae normal.   Cardiovascular:      Rate and Rhythm: Normal rate and regular rhythm.   Pulmonary:      Effort: Pulmonary effort is normal. No respiratory distress.      Breath sounds: No wheezing or rales.   Abdominal:      General: Bowel sounds are normal.      Palpations: Abdomen is soft.   Musculoskeletal:      Cervical back: Neck supple.      Right lower leg: No edema.      Left lower leg: No edema.   Skin:     General: Skin is warm and dry.   Neurological:      Mental Status: She is alert. Mental status is at baseline.   Psychiatric:         Mood and Affect: Mood normal.         Behavior: Behavior normal.         Consultants     Consult Orders (all) (From admission, onward)     Start     Ordered    09/17/21 0410  Inpatient Neurology Consult General  Once     Specialty:  Neurology  Provider:  Poncho Azevedo MD    09/17/21 0410              Procedures     * Surgery not found *      Imaging Results (All)     None            Pertinent Labs     Results from last 7 days   Lab Units 09/20/21  0627 09/19/21  0930 09/17/21  0956   WBC 10*3/mm3 7.56 8.88 9.52   HEMOGLOBIN g/dL 12.0 12.1 11.7*   PLATELETS 10*3/mm3 220 219 216     Results from last 7 days   Lab Units 09/20/21  0627 09/19/21  0930 09/17/21  0956   SODIUM mmol/L 141 143 138   POTASSIUM mmol/L 3.5 3.5 3.9   CHLORIDE mmol/L 105 104 100   CO2 mmol/L 25.0 25.3 24.9   BUN mg/dL 9 8 7   CREATININE mg/dL 0.69 0.71 0.70   GLUCOSE mg/dL 92 94 120*   Estimated Creatinine Clearance: 106  mL/min (by C-G formula based on SCr of 0.69 mg/dL).    Results from last 7 days   Lab Units 09/20/21  0627 09/19/21  0930 09/17/21  0956   CALCIUM mg/dL 8.3* 8.6 8.3*   MAGNESIUM mg/dL  --  1.9 1.5*   PHOSPHORUS mg/dL  --   --  3.2       Results from last 7 days   Lab Units 09/17/21  0956   CK TOTAL U/L 161           Invalid input(s): LDLCALC      Results from last 7 days   Lab Units 09/20/21  1047   COVID19  Not Detected       Test Results Pending at Discharge       Discharge Details        Discharge Medications      New Medications      Instructions Start Date   lacosamide 100 MG tablet tablet  Commonly known as: VIMPAT   100 mg, Oral, Every 12 Hours Scheduled      metoprolol tartrate 25 MG tablet  Commonly known as: LOPRESSOR   12.5 mg, Oral, Every 12 Hours Scheduled      nystatin 140523 UNIT/GM powder  Commonly known as: MYCOSTATIN   Topical, Every 12 Hours Scheduled         Changes to Medications      Instructions Start Date   levETIRAcetam 1000 MG tablet  Commonly known as: KEPPRA  What changed:   · medication strength  · how much to take  · when to take this   2,000 mg, Oral, Every 12 Hours Scheduled         Continue These Medications      Instructions Start Date   amLODIPine 5 MG tablet  Commonly known as: NORVASC   5 mg, Oral, Daily      aspirin 81 MG chewable tablet   81 mg, Oral, Daily      atorvastatin 80 MG tablet  Commonly known as: LIPITOR   80 mg, Oral, Nightly      DULoxetine 60 MG capsule  Commonly known as: CYMBALTA   60 mg, Oral, Daily      lisinopril 40 MG tablet  Commonly known as: PRINIVIL,ZESTRIL   40 mg, Oral, Daily      melatonin 3 MG tablet   10 mg, Oral, Nightly PRN      metFORMIN 1000 MG tablet  Commonly known as: GLUCOPHAGE   1,000 mg, Oral, 2 Times Daily With Meals      pioglitazone 15 MG tablet  Commonly known as: ACTOS   15 mg, Oral, Daily         Stop These Medications    traZODone 50 MG tablet  Commonly known as: DESYREL            No Known Allergies    Discharge  Disposition:  Home-Health Care c      Discharge Diet:  Diet Order   Procedures   • Diet Regular; Thin; Consistent Carbohydrate       Discharge Activity:   Activity Instructions     Activity as Tolerated            CODE STATUS:    Code Status and Medical Interventions:   Ordered at: 09/17/21 0410     Code Status:    CPR     Medical Interventions (Level of Support Prior to Arrest):    Full       No future appointments.   Contact information for follow-up providers     Terrence Damon. Schedule an appointment as soon as possible for a visit in 1 week(s).    Specialty: Family Medicine  Contact information:  [address] [facility] [address] [phone]             Bruce Mayfield MD Follow up.    Specialties: Neurology, Hospitalist  Why: Follow up as needed  Contact information:  [address]  [address] [address] [phone]                   Contact information for after-discharge care     Home Medical Care     [facility] .    Service: Home Health Services  Contact information:  [address] [address] [phone]                             Time Spent on Discharge:  Greater than 30 minutes      RAFI Barton  [facility]  09/21/21  13:34 EDT

## 2021-09-22 ENCOUNTER — TELEPHONE (OUTPATIENT)
Dept: NEUROLOGY | Facility: CLINIC | Age: 56
End: 2021-09-22

## 2021-09-22 ENCOUNTER — READMISSION MANAGEMENT (OUTPATIENT)
Dept: CALL CENTER | Facility: HOSPITAL | Age: 56
End: 2021-09-22

## 2021-09-22 NOTE — TELEPHONE ENCOUNTER
Caller: BONG    Relationship to patient: DAUGHTER    Best call back number: [phone]    New or established patient?  [x] New  [] Established    Date of discharge: 9-21-21    Facility discharged from:     Diagnosis/Symptoms: SEIZURES    Length of stay (If applicable): 4 DAYS    Specialty Only: Did you see a [facility] health provider?    [x] Yes  [] No  If so, who? DR ORDOÑEZ, DR BARLOW    PLEASE ADVISE

## 2021-09-22 NOTE — OUTREACH NOTE
Prep Survey      Responses   [facility] facility patient discharged from?  [place]   Is LACE score < 7 ?  No   Emergency Room discharge w/ pulse ox?  No   Eligibility  Readm Mgmt   Discharge diagnosis  Breakthrough seizure    Does the patient have one of the following disease processes/diagnoses(primary or secondary)?  Other   Does the patient have Home health ordered?  Yes   What is the Home health agency?   [facility]    Is there a DME ordered?  Yes   What DME was ordered?  Walker per [facility]    Prep survey completed?  Yes          Claudia Dillard RN

## 2021-09-23 ENCOUNTER — READMISSION MANAGEMENT (OUTPATIENT)
Dept: CALL CENTER | Facility: HOSPITAL | Age: 56
End: 2021-09-23

## 2021-09-23 NOTE — OUTREACH NOTE
Medical Week 1 Survey      Responses   [facility] [place] patient discharged from?  [place]   Does the patient have one of the following disease processes/diagnoses(primary or secondary)?  Other   Week 1 attempt successful?  No   Unsuccessful attempts  Attempt 1          Celina Michael RN

## 2021-09-28 ENCOUNTER — READMISSION MANAGEMENT (OUTPATIENT)
Dept: CALL CENTER | Facility: HOSPITAL | Age: 56
End: 2021-09-28

## 2021-09-28 NOTE — OUTREACH NOTE
Medical Week 1 Survey      Responses   [facility] [place] patient discharged from?  [place]   Does the patient have one of the following disease processes/diagnoses(primary or secondary)?  Other   Week 1 attempt successful?  Yes   Call start time  0940   Call end time  0944   Discharge diagnosis  Breakthrough seizure    Is patient permission given to speak with other caregiver?  Yes   List who call center can speak with  Dtr any of her 4 her [place], [place], Lise   Person spoke with today (if not patient) and relationship  Malmoncho   Meds reviewed with patient/caregiver?  Yes   Is the patient having any side effects they believe may be caused by any medication additions or changes?  No   Does the patient have all medications ordered at discharge?  Yes   Is the patient taking all medications as directed (includes completed medication regime)?  Yes   Does the patient have a primary care provider?   Yes   Does the patient have an appointment with their PCP within 7 days of discharge?  Yes   Comments regarding PCP  Saw PCP already   What is the Home health agency?   [facility]    Has home health visited the patient within 72 hours of discharge?  Yes   Home health comments   saw her Thursday last week    What DME was ordered?  [facility]    Has all DME been delivered?  Yes   Did the patient receive a copy of their discharge instructions?  Yes   Nursing interventions  Reviewed instructions with patient   What is the patient's perception of their health status since discharge?  Improving   Is the patient/caregiver able to teach back signs and symptoms related to disease process for when to call PCP?  Yes   Is the patient/caregiver able to teach back signs and symptoms related to disease process for when to call 911?  Yes   Is the patient/caregiver able to teach back the hierarchy of who to call/visit for symptoms/problems? PCP, Specialist, Home health nurse, Urgent Care, ED, 911  Yes   If the patient is a  current smoker, are they able to teach back resources for cessation?  Not a smoker   Week 1 call completed?  Yes   Wrap up additional comments  Spoke with dtcammie Tao. States her mother is improving. No needs identified.           Ekaterina Shaw RN

## 2021-10-06 ENCOUNTER — READMISSION MANAGEMENT (OUTPATIENT)
Dept: CALL CENTER | Facility: HOSPITAL | Age: 56
End: 2021-10-06

## 2021-10-06 NOTE — OUTREACH NOTE
Medical Week 2 Survey      Responses   [facility] patient discharged from?  [place]   Does the patient have one of the following disease processes/diagnoses(primary or secondary)?  Other   Week 2 attempt successful?  Yes   Call start time  1504   Discharge diagnosis  Breakthrough seizure    Call end time  1506   Meds reviewed with patient/caregiver?  Yes   Is the patient having any side effects they believe may be caused by any medication additions or changes?  No   Does the patient have all medications ordered at discharge?  Yes   Is the patient taking all medications as directed (includes completed medication regime)?  Yes   Comments regarding appointments  PCP visited pt in home week of 9/27/21   Does the patient have a primary care provider?   Yes   Does the patient have an appointment with their PCP within 7 days of discharge?  Yes   Has the patient kept scheduled appointments due by today?  Yes   What is the Home health agency?   [facility]     Has home health visited the patient within 72 hours of discharge?  Yes   Psychosocial issues?  No   Did the patient receive a copy of their discharge instructions?  Yes   Nursing interventions  Reviewed instructions with patient   What is the patient's perception of their health status since discharge?  Improving   Is the patient/caregiver able to teach back signs and symptoms related to disease process for when to call PCP?  Yes   Is the patient/caregiver able to teach back signs and symptoms related to disease process for when to call 911?  Yes   Is the patient/caregiver able to teach back the hierarchy of who to call/visit for symptoms/problems? PCP, Specialist, Home health nurse, Urgent Care, ED, 911  Yes   Additional teach back comments  states free of sx activity   Week 2 Call Completed?  Yes          Celina iMchael RN

## 2021-10-13 ENCOUNTER — READMISSION MANAGEMENT (OUTPATIENT)
Dept: CALL CENTER | Facility: HOSPITAL | Age: 56
End: 2021-10-13

## 2021-10-13 NOTE — OUTREACH NOTE
Medical Week 3 Survey      Responses   [facility] patient discharged from? [place]   Does the patient have one of the following disease processes/diagnoses(primary or secondary)? Other   Week 3 attempt successful? No   Unsuccessful attempts Attempt 1          Clarisse Rodríguez RN

## 2021-10-15 ENCOUNTER — READMISSION MANAGEMENT (OUTPATIENT)
Dept: CALL CENTER | Facility: HOSPITAL | Age: 56
End: 2021-10-15

## 2021-10-15 NOTE — OUTREACH NOTE
Medical Week 3 Survey      Responses   [facility] patient discharged from? [place]   Does the patient have one of the following disease processes/diagnoses(primary or secondary)? Other   Week 3 attempt successful? No   Unsuccessful attempts Attempt 2          Danny Brady RN

## 2023-03-30 ENCOUNTER — TRANSCRIBE ORDERS (OUTPATIENT)
Dept: ADMINISTRATIVE | Facility: HOSPITAL | Age: 58
End: 2023-03-30
Payer: MEDICARE

## 2023-03-30 DIAGNOSIS — Z71.3 DIETARY COUNSELING: ICD-10-CM

## 2023-03-30 DIAGNOSIS — Z12.39 OTHER SCREENING BREAST EXAMINATION: Primary | ICD-10-CM
